# Patient Record
Sex: FEMALE | Race: WHITE | NOT HISPANIC OR LATINO | Employment: FULL TIME | URBAN - METROPOLITAN AREA
[De-identification: names, ages, dates, MRNs, and addresses within clinical notes are randomized per-mention and may not be internally consistent; named-entity substitution may affect disease eponyms.]

---

## 2017-02-13 ENCOUNTER — OFFICE VISIT (OUTPATIENT)
Dept: FAMILY MEDICINE | Facility: CLINIC | Age: 47
End: 2017-02-13
Payer: COMMERCIAL

## 2017-02-13 VITALS
WEIGHT: 177.2 LBS | BODY MASS INDEX: 41.01 KG/M2 | HEIGHT: 55 IN | TEMPERATURE: 97.6 F | HEART RATE: 68 BPM | SYSTOLIC BLOOD PRESSURE: 134 MMHG | DIASTOLIC BLOOD PRESSURE: 86 MMHG

## 2017-02-13 DIAGNOSIS — Z00.00 ROUTINE HISTORY AND PHYSICAL EXAMINATION OF ADULT: ICD-10-CM

## 2017-02-13 DIAGNOSIS — Z12.31 ENCOUNTER FOR SCREENING MAMMOGRAM FOR BREAST CANCER: Primary | ICD-10-CM

## 2017-02-13 LAB
ALBUMIN SERPL-MCNC: 4 G/DL (ref 3.4–5)
ALP SERPL-CCNC: 51 U/L (ref 40–150)
ALT SERPL W P-5'-P-CCNC: 119 U/L (ref 0–50)
ANION GAP SERPL CALCULATED.3IONS-SCNC: 7 MMOL/L (ref 3–14)
AST SERPL W P-5'-P-CCNC: 50 U/L (ref 0–45)
BASOPHILS # BLD AUTO: 0 10E9/L (ref 0–0.2)
BASOPHILS NFR BLD AUTO: 0.2 %
BILIRUB SERPL-MCNC: 0.6 MG/DL (ref 0.2–1.3)
BUN SERPL-MCNC: 7 MG/DL (ref 7–30)
CALCIUM SERPL-MCNC: 9.1 MG/DL (ref 8.5–10.1)
CHLORIDE SERPL-SCNC: 106 MMOL/L (ref 94–109)
CHOLEST SERPL-MCNC: 158 MG/DL
CO2 SERPL-SCNC: 28 MMOL/L (ref 20–32)
CREAT SERPL-MCNC: 0.69 MG/DL (ref 0.52–1.04)
DIFFERENTIAL METHOD BLD: ABNORMAL
EOSINOPHIL # BLD AUTO: 0.1 10E9/L (ref 0–0.7)
EOSINOPHIL NFR BLD AUTO: 2.1 %
ERYTHROCYTE [DISTWIDTH] IN BLOOD BY AUTOMATED COUNT: 12.7 % (ref 10–15)
GFR SERPL CREATININE-BSD FRML MDRD: ABNORMAL ML/MIN/1.7M2
GLUCOSE SERPL-MCNC: 85 MG/DL (ref 70–99)
HCT VFR BLD AUTO: 39.5 % (ref 35–47)
HDLC SERPL-MCNC: 49 MG/DL
HGB BLD-MCNC: 13.7 G/DL (ref 11.7–15.7)
LDLC SERPL CALC-MCNC: 86 MG/DL
LYMPHOCYTES # BLD AUTO: 2.2 10E9/L (ref 0.8–5.3)
LYMPHOCYTES NFR BLD AUTO: 44.5 %
MCH RBC QN AUTO: 31.4 PG (ref 26.5–33)
MCHC RBC AUTO-ENTMCNC: 34.7 G/DL (ref 31.5–36.5)
MCV RBC AUTO: 91 FL (ref 78–100)
MONOCYTES # BLD AUTO: 0.4 10E9/L (ref 0–1.3)
MONOCYTES NFR BLD AUTO: 8 %
NEUTROPHILS # BLD AUTO: 2.2 10E9/L (ref 1.6–8.3)
NEUTROPHILS NFR BLD AUTO: 45.2 %
NONHDLC SERPL-MCNC: 109 MG/DL
PLATELET # BLD AUTO: 134 10E9/L (ref 150–450)
POTASSIUM SERPL-SCNC: 3.9 MMOL/L (ref 3.4–5.3)
PROT SERPL-MCNC: 7 G/DL (ref 6.8–8.8)
RBC # BLD AUTO: 4.36 10E12/L (ref 3.8–5.2)
SODIUM SERPL-SCNC: 141 MMOL/L (ref 133–144)
TRIGL SERPL-MCNC: 115 MG/DL
WBC # BLD AUTO: 4.9 10E9/L (ref 4–11)

## 2017-02-13 PROCEDURE — 87624 HPV HI-RISK TYP POOLED RSLT: CPT | Performed by: PHYSICIAN ASSISTANT

## 2017-02-13 PROCEDURE — 99396 PREV VISIT EST AGE 40-64: CPT | Performed by: PHYSICIAN ASSISTANT

## 2017-02-13 PROCEDURE — G0145 SCR C/V CYTO,THINLAYER,RESCR: HCPCS | Performed by: PHYSICIAN ASSISTANT

## 2017-02-13 PROCEDURE — 80053 COMPREHEN METABOLIC PANEL: CPT | Performed by: PHYSICIAN ASSISTANT

## 2017-02-13 PROCEDURE — 85025 COMPLETE CBC W/AUTO DIFF WBC: CPT | Performed by: PHYSICIAN ASSISTANT

## 2017-02-13 PROCEDURE — 36415 COLL VENOUS BLD VENIPUNCTURE: CPT | Performed by: PHYSICIAN ASSISTANT

## 2017-02-13 PROCEDURE — 80061 LIPID PANEL: CPT | Performed by: PHYSICIAN ASSISTANT

## 2017-02-13 NOTE — LETTER
[facility]  [address] [place] [id number]  [phone]      February 20, 2017    Alis APRIL Annieharismaijennifer  [address] [facility] [address]    Dear Alis,  We are happy to inform you that your PAP smear result from 2/13/17 is normal.  We are now able to do a follow up test on PAP smears. The DNA test is for HPV (Human Papilloma Virus). Cervical cancer is closely linked with certain types of HPV. Your result showed no evidence of high risk HPV.  Therefore we recommend you return in 3 years for your next pap smear and HPV test.  You will still need to return to the clinic every year for an annual exam and other preventive tests.  Please contact the clinic with any questions.  Sincerely,  Josue Sue PA-C/arvin

## 2017-02-13 NOTE — MR AVS SNAPSHOT
After Visit Summary   2/13/2017    Alis Norris    MRN: [id number]           Patient Information     Date Of Birth          1970        Visit Information        Provider Department      2/13/2017 9:00 AM Josue Sue PA-C [facility]        Today's Diagnoses     Encounter for screening mammogram for breast cancer    -  1    Routine history and physical examination of adult          Care Instructions      Preventive Health Recommendations  Female Ages 40 to 49    Yearly exam:     See your health care provider every year in order to  1. Review health changes.   2. Discuss preventive care.    3. Review your medicines if your doctor prescribed any.      Get a Pap test every three years (unless you have an abnormal result and your provider advises testing more often).      If you get Pap tests with HPV test, you only need to test every 5 years, unless you have an abnormal result. You do not need a Pap test if your uterus was removed (hysterectomy) and you have not had cancer.      You should be tested each year for STDs (sexually transmitted diseases), if you're at risk.       Ask your doctor if you should have a mammogram.      Have a colonoscopy (test for colon cancer) if someone in your family has had colon cancer or polyps before age 50.       Have a cholesterol test every 5 years.       Have a diabetes test (fasting glucose) after age 45. If you are at risk for diabetes, you should have this test every 3 years.    Shots: Get a flu shot each year. Get a tetanus shot every 10 years.     Nutrition:     Eat at least 5 servings of fruits and vegetables each day.    Eat whole-grain bread, whole-wheat pasta and brown rice instead of white grains and rice.    Talk to your provider about Calcium and Vitamin D.     Lifestyle    Exercise at least 150 minutes a week (an average of 30 minutes a day, 5 days a week). This will help you control your weight and prevent disease.    Limit  "alcohol to one drink per day.    No smoking.     Wear sunscreen to prevent skin cancer.    See your dentist every six months for an exam and cleaning.        Follow-ups after your visit        Future tests that were ordered for you today     Open Future Orders        Priority Expected Expires Ordered    *MA Screening Digital Bilateral Routine  2018            Who to contact     If you have questions or need follow up information about today's clinic visit or your schedule please contact [facility] directly at [phone].  Normal or non-critical lab and imaging results will be communicated to you by SnapMyAdhart, letter or phone within 4 business days after the clinic has received the results. If you do not hear from us within 7 days, please contact the clinic through Instagramt or phone. If you have a critical or abnormal lab result, we will notify you by phone as soon as possible.  Submit refill requests through InfraReDx or call your pharmacy and they will forward the refill request to us. Please allow 3 business days for your refill to be completed.          Additional Information About Your Visit        SnapMyAdharUbiquisys Information     InfraReDx lets you send messages to your doctor, view your test results, renew your prescriptions, schedule appointments and more. To sign up, go to [email or web address] . Click on \"Log in\" on the left side of the screen, which will take you to the Welcome page. Then click on \"Sign up Now\" on the right side of the page.     You will be asked to enter the access code listed below, as well as some personal information. Please follow the directions to create your username and password.     Your access code is: [email or web address]  Expires: 2017 10:06 AM     Your access code will  in 90 days. If you need help or a new code, please call your [facility] or [phone].        Care EveryWhere ID     This is your Care EveryWhere ID. This could be used by other " "organizations to access your [place] medical records  [id number]        Your Vitals Were     Pulse Temperature Height Last Period BMI (Body Mass Index)       68 97.6  F (36.4  C) (Tympanic) 4' 4\" (1.321 m) 03/01/2003 46.07 kg/m2        Blood Pressure from Last 3 Encounters:   02/13/17 134/86   11/18/14 132/84   11/27/12 131/82    Weight from Last 3 Encounters:   02/13/17 177 lb 3.2 oz (80.4 kg)   11/18/14 148 lb (67.1 kg)   11/27/12 172 lb 6.4 oz (78.2 kg)              We Performed the Following     CBC with platelets and differential     Comprehensive metabolic panel (BMP + Alb, Alk Phos, ALT, AST, Total. Bili, TP)     Lipid Profile with reflex to direct LDL        Primary Care Provider    Provider Unknown       No address on file        Thank you!     Thank you for choosing [facility]  for your care. Our goal is always to provide you with excellent care. Hearing back from our patients is one way we can continue to improve our services. Please take a few minutes to complete the written survey that you may receive in the mail after your visit with us. Thank you!             Your Updated Medication List - Protect others around you: Learn how to safely use, store and throw away your medicines at www.disposemymeds.org.          This list is accurate as of: 2/13/17 10:06 AM.  Always use your most recent med list.                   Brand Name Dispense Instructions for use    CALCIUM 600 + D PO      Reported on 2/13/2017       CHEWABLE MULTIPLE VITAMINS OR          VITAMIN D PO      Reported on 2/13/2017         "

## 2017-02-13 NOTE — PROGRESS NOTES
Physical      SUBJECTIVE:     CC: Alis Norris is an 46 year old woman who presents for preventive health visit.     Patient reports shooting breast pain for the past few months, unsure but may be cyclic with menstrual cycle. She has a family history of breast cancer in two paternal aunts, both diagnosed after 50-years-old.     She also has been experiencing dizziness and blurry vision on occasion at work. She believes it may be due to dehydration because symptoms resolve after increasing fluid intake.     Healthy Habits:    Do you get at least three servings of calcium containing foods daily (dairy, green leafy vegetables, etc.)? yes    Amount of exercise or daily activities, outside of work: 0 day(s) per week    Problems taking medications regularly No    Medication side effects: No    Have you had an eye exam in the past two years? yes    Do you see a dentist twice per year? yes    Do you have sleep apnea, excessive snoring or daytime drowsiness?yes-snoring       Today's PHQ-2 Score:   PHQ-2 ( 1999 Pfizer) 10/22/2012   Q1: Little interest or pleasure in doing things 0   Q2: Feeling down, depressed or hopeless 0   PHQ-2 Score 0       Abuse: Current or Past(Physical, Sexual or Emotional)- No  Do you feel safe in your environment - Yes    Social History   Substance Use Topics     Smoking status: Never Smoker     Smokeless tobacco: Never Used     Alcohol use No     The patient does not drink >3 drinks per day nor >7 drinks per week.    Recent Labs   Lab Test  11/18/14   1707  07/23/09   1004   CHOL  208*  196   HDL  64  48*   LDL  107  105   TRIG  184*  215*   CHOLHDLRATIO  3.2  4.0       Reviewed orders with patient.  Reviewed health maintenance and updated orders accordingly - Yes    Mammo Decision Support:  Patient under age 50, mutual decision reflected in health maintenance. Alternate mammogram schedule due to breast cancer history and breast pain.    Pertinent mammograms are reviewed under the imaging  tab.  History of abnormal Pap smear:   Last 3 Pap Results:   PAP (no units)   Date Value   2014 NIL   10/22/2012 NIL   10/12/2011 NIL     All Histories reviewed and updated in Epic.  Past Medical History   Diagnosis Date     Carcinoma in situ of cervix uteri      s/p LEEP      Past Surgical History   Procedure Laterality Date     Surgical history of -         x3     Surgical history of -        Breast reduction surgery     Hysterectomy, pap still indicated  2004     C-Hyst (supracervical) for placenta accreta     Hc conization cervix,knife/laser       Followed by LEEP-CIS     Obstetric History       T0      TAB0   SAB0   E0   M0   L3       # Outcome Date GA Lbr Ayaan/2nd Weight Sex Delivery Anes PTL Lv   3 Para            2 Para            1 Para                   ROS:  C: NEGATIVE for fever, chills, change in weight  I: NEGATIVE for worrisome rashes, moles or lesions  E: NEGATIVE for vision changes or irritation  ENT: NEGATIVE for ear, mouth and throat problems  R: NEGATIVE for significant cough or SOB  B: POSITIVE for shooting pain. NEGATIVE for masses, tenderness or discharge.  CV: NEGATIVE for chest pain, palpitations or peripheral edema  GI: NEGATIVE for nausea, abdominal pain, heartburn, or change in bowel habits  : NEGATIVE for unusual urinary or vaginal symptoms. Periods are regular.  M: NEGATIVE for significant arthralgias or myalgia  N: POSITIVE for dizziness or lightheadedness. NEGATIVE for weakness or headaches.  P: NEGATIVE for changes in mood or affect    Problem list, Medication list, Allergies, and Medical/Social/Surgical histories reviewed in [facility] and updated as appropriate.  BP Readings from Last 3 Encounters:   17 134/86   14 132/84   12 131/82    Wt Readings from Last 3 Encounters:   17 177 lb 3.2 oz (80.4 kg)   14 148 lb (67.1 kg)   12 172 lb 6.4 oz (78.2 kg)         Patient Active Problem List   Diagnosis     Mild major  "depression (H)     Ganglion cyst     HYPERTROPHIC SCAR     CARDIOVASCULAR SCREENING; LDL GOAL LESS THAN 160     Hyperlipidemia LDL goal <160     Past Surgical History   Procedure Laterality Date     Surgical history of -         x3     Surgical history of -        Breast reduction surgery     Hysterectomy, pap still indicated  2004     C-Hyst (supracervical) for placenta accreta     Hc conization cervix,knife/laser       Followed by LEEP-[facility]IS       Social History   Substance Use Topics     Smoking status: Never Smoker     Smokeless tobacco: Never Used     Alcohol use No     Family History   Problem Relation Age of Onset     Obesity Father      DIABETES Father      Hypertension Father      65     CEREBROVASCULAR DISEASE Maternal Grandmother      Cardiovascular Paternal Grandmother      DIABETES Paternal Grandfather      Depression Mother          Current Outpatient Prescriptions   Medication Sig Dispense Refill     Pediatric Multiple Vitamins (CHEWABLE MULTIPLE VITAMINS OR)        Calcium Carbonate-Vitamin D (CALCIUM 600 + D OR) Reported on 2017       VITAMIN D OR Reported on 2017       No Known Allergies  Recent Labs   Lab Test  14   1707  10/22/12   1857  09   1004   LDL  107   --   105   HDL  64   --   48*   TRIG  184*   --   215*   ALT   --   29   --    CR  0.69   --    --    GFRESTIMATED  >90  Non  GFR Calc     --    --    GFRESTBLACK  >90   GFR Calc     --    --    POTASSIUM  3.5   --    --    TSH  1.42   --   0.95      OBJECTIVE:     /86 (Cuff Size: Adult Regular)  Pulse 68  Temp 97.6  F (36.4  C) (Tympanic)  Ht 4' 4\" (1.321 m)  Wt 177 lb 3.2 oz (80.4 kg)  LMP 2003  BMI 46.07 kg/m2  EXAM:  GENERAL: healthy, alert and no distress  EYES: Eyes grossly normal to inspection, PERRL and conjunctivae and sclerae normal  HENT: ear canals and TM's normal, nose and mouth without ulcers or lesions  NECK: no adenopathy, no asymmetry, " "masses, or scars and thyroid normal to palpation  RESP: lungs clear to auscultation - no rales, rhonchi or wheezes  BREAST: normal without masses, tenderness or nipple discharge and no palpable axillary masses or adenopathy  CV: regular rate and rhythm, normal S1 S2, no S3 or S4, no murmur, click or rub, no peripheral edema and peripheral pulses strong  ABDOMEN: soft, nontender, no hepatosplenomegaly, no masses and bowel sounds normal   (female): normal female external genitalia, normal urethral meatus, vaginal mucosa pink, moist, well rugated, and normal cervix/adnexa/uterus without masses or discharge  MS: no gross musculoskeletal defects noted, no edema  SKIN: no suspicious lesions or rashes  NEURO: Normal strength and tone, mentation intact and speech normal  PSYCH: mentation appears normal, affect normal/bright    ASSESSMENT/PLAN:         ICD-10-CM    1. Encounter for screening mammogram for breast cancer Z12.31 *MA Screening Digital Bilateral   2. Routine history and physical examination of adult Z00.00 Lipid Profile with reflex to direct LDL     CBC with platelets and differential     Comprehensive metabolic panel (BMP + Alb, Alk Phos, ALT, AST, Total. Bili, TP)     Pap imaged thin layer screen with HPV - recommended age 30 - 65     HPV High Risk Types DNA Cervical       COUNSELING:   Reviewed preventive health counseling, as reflected in patient instructions       Regular exercise       Healthy diet/nutrition       Vision screening       Osteoporosis Prevention/Bone Health     Reports that she has never smoked. She has never used smokeless tobacco.    Estimated body mass index is 46.07 kg/(m^2) as calculated from the following:    Height as of this encounter: 4' 4\" (1.321 m).    Weight as of this encounter: 177 lb 3.2 oz (80.4 kg).   Weight management plan: Discussed healthy diet and exercise guidelines and patient will follow up in 12 months in clinic to re-evaluate.    Discussed dehydration verse anemia " causing symptoms of dizziness and blurry vision. Suggested to increase fluid intake and CBC ordered to screen for low Hgb.     Ordered screening mammogram due to recent breast pain and family history of breast cancer.     Counseling Resources:  ATP IV Guidelines  Pooled Cohorts Equation Calculator  Breast Cancer Risk Calculator  FRAX Risk Assessment  ICSI Preventive Guidelines  Dietary Guidelines for Americans, 2010  USDA's MyPlate  ASA Prophylaxis  Lung CA Screening    Josue Sue PA-C  [facility]

## 2017-02-13 NOTE — NURSING NOTE
"Chief Complaint   Patient presents with     Physical       Initial /86 (Cuff Size: Adult Regular)  Pulse 68  Temp 97.6  F (36.4  C) (Tympanic)  Ht 4' 4\" (1.321 m)  Wt 177 lb 3.2 oz (80.4 kg)  LMP 03/01/2003  BMI 46.07 kg/m2 Estimated body mass index is 46.07 kg/(m^2) as calculated from the following:    Height as of this encounter: 4' 4\" (1.321 m).    Weight as of this encounter: 177 lb 3.2 oz (80.4 kg).  Medication Reconciliation: complete    Health Maintenance that is potentially due pending provider review:  Pap Smear    Possibly completing today per provider review.    Yael GARCIA CMA    "

## 2017-02-15 LAB
COPATH REPORT: NORMAL
PAP: NORMAL

## 2017-02-17 LAB
FINAL DIAGNOSIS: NORMAL
HPV HR 12 DNA CVX QL NAA+PROBE: NEGATIVE
HPV16 DNA SPEC QL NAA+PROBE: NEGATIVE
HPV18 DNA SPEC QL NAA+PROBE: NEGATIVE
SPECIMEN DESCRIPTION: NORMAL

## 2019-05-04 ENCOUNTER — TELEPHONE (OUTPATIENT)
Dept: FAMILY MEDICINE | Facility: CLINIC | Age: 49
End: 2019-05-04

## 2019-08-30 ENCOUNTER — OFFICE VISIT (OUTPATIENT)
Dept: FAMILY MEDICINE | Facility: CLINIC | Age: 49
End: 2019-08-30
Payer: COMMERCIAL

## 2019-08-30 VITALS
WEIGHT: 186 LBS | RESPIRATION RATE: 17 BRPM | TEMPERATURE: 97.8 F | DIASTOLIC BLOOD PRESSURE: 80 MMHG | BODY MASS INDEX: 34.23 KG/M2 | HEART RATE: 64 BPM | OXYGEN SATURATION: 98 % | HEIGHT: 62 IN | SYSTOLIC BLOOD PRESSURE: 120 MMHG

## 2019-08-30 DIAGNOSIS — Z12.11 SPECIAL SCREENING FOR MALIGNANT NEOPLASMS, COLON: ICD-10-CM

## 2019-08-30 DIAGNOSIS — Z00.00 ROUTINE GENERAL MEDICAL EXAMINATION AT A HEALTH CARE FACILITY: Primary | ICD-10-CM

## 2019-08-30 DIAGNOSIS — E78.5 HYPERLIPIDEMIA LDL GOAL <160: ICD-10-CM

## 2019-08-30 DIAGNOSIS — D06.9 CARCINOMA IN SITU OF CERVIX, UNSPECIFIED LOCATION: ICD-10-CM

## 2019-08-30 LAB
CHOLEST SERPL-MCNC: 210 MG/DL
GLUCOSE SERPL-MCNC: 97 MG/DL (ref 70–99)
HDLC SERPL-MCNC: 61 MG/DL
LDLC SERPL CALC-MCNC: 113 MG/DL
NONHDLC SERPL-MCNC: 149 MG/DL
TRIGL SERPL-MCNC: 182 MG/DL

## 2019-08-30 PROCEDURE — 87624 HPV HI-RISK TYP POOLED RSLT: CPT | Performed by: FAMILY MEDICINE

## 2019-08-30 PROCEDURE — 82947 ASSAY GLUCOSE BLOOD QUANT: CPT | Performed by: FAMILY MEDICINE

## 2019-08-30 PROCEDURE — G0145 SCR C/V CYTO,THINLAYER,RESCR: HCPCS | Performed by: FAMILY MEDICINE

## 2019-08-30 PROCEDURE — 99396 PREV VISIT EST AGE 40-64: CPT | Performed by: FAMILY MEDICINE

## 2019-08-30 PROCEDURE — 36415 COLL VENOUS BLD VENIPUNCTURE: CPT | Performed by: FAMILY MEDICINE

## 2019-08-30 PROCEDURE — 80061 LIPID PANEL: CPT | Performed by: FAMILY MEDICINE

## 2019-08-30 ASSESSMENT — ENCOUNTER SYMPTOMS
NERVOUS/ANXIOUS: 0
PALPITATIONS: 0
FREQUENCY: 0
HEADACHES: 0
COUGH: 0
JOINT SWELLING: 0
ARTHRALGIAS: 1
DIARRHEA: 0
CONSTIPATION: 0
SORE THROAT: 0
HEMATURIA: 0
ABDOMINAL PAIN: 0
CHILLS: 0
WEAKNESS: 0
DYSURIA: 0
FEVER: 0
HEARTBURN: 0
DIZZINESS: 0
PARESTHESIAS: 0
BREAST MASS: 0
EYE PAIN: 0
MYALGIAS: 0
HEMATOCHEZIA: 0
NAUSEA: 0
SHORTNESS OF BREATH: 0

## 2019-08-30 ASSESSMENT — MIFFLIN-ST. JEOR: SCORE: 1421.94

## 2019-08-30 ASSESSMENT — PATIENT HEALTH QUESTIONNAIRE - PHQ9
5. POOR APPETITE OR OVEREATING: NOT AT ALL
SUM OF ALL RESPONSES TO PHQ QUESTIONS 1-9: 2

## 2019-08-30 ASSESSMENT — ANXIETY QUESTIONNAIRES
IF YOU CHECKED OFF ANY PROBLEMS ON THIS QUESTIONNAIRE, HOW DIFFICULT HAVE THESE PROBLEMS MADE IT FOR YOU TO DO YOUR WORK, TAKE CARE OF THINGS AT HOME, OR GET ALONG WITH OTHER PEOPLE: NOT DIFFICULT AT ALL
5. BEING SO RESTLESS THAT IT IS HARD TO SIT STILL: NOT AT ALL
6. BECOMING EASILY ANNOYED OR IRRITABLE: NOT AT ALL
2. NOT BEING ABLE TO STOP OR CONTROL WORRYING: NOT AT ALL
3. WORRYING TOO MUCH ABOUT DIFFERENT THINGS: NOT AT ALL
1. FEELING NERVOUS, ANXIOUS, OR ON EDGE: NOT AT ALL
7. FEELING AFRAID AS IF SOMETHING AWFUL MIGHT HAPPEN: NOT AT ALL
GAD7 TOTAL SCORE: 0

## 2019-08-30 ASSESSMENT — PAIN SCALES - GENERAL: PAINLEVEL: NO PAIN (0)

## 2019-08-30 NOTE — PROGRESS NOTES
SUBJECTIVE:   CC: Alis Norris is an 48 year old woman who presents for preventive health visit.   Chief Complaint   Patient presents with     Physical     Gyn Exam     pap      Mole on genitalia.   Breasts get sore once a month.  Both breasts.  Can be either one.  INtermittent.  Seems monthly.     She had a hysterectomy in 2004.  Immediately following delivery, placenta accreta.    2002 [facility] biopsy and Leep.     On no medications.     Healthy Habits:     Getting at least 3 servings of Calcium per day:  Yes    Bi-annual eye exam:  Yes    Dental care twice a year:  Yes    Sleep apnea or symptoms of sleep apnea:  Daytime drowsiness and Excessive snoring    Diet:  Regular (no restrictions)    Frequency of exercise:  None    Taking medications regularly:  Yes    Medication side effects:  None    PHQ-2 Total Score: 0    Additional concerns today:  Yes  Snores a lot.  No known apnea.  Sleepy during the day.     Today's PHQ-2 Score:   PHQ-2 ( 1999 Pfizer) 8/30/2019   Q1: Little interest or pleasure in doing things 0   Q2: Feeling down, depressed or hopeless 0   PHQ-2 Score 0   Q1: Little interest or pleasure in doing things Not at all   Q2: Feeling down, depressed or hopeless Not at all   PHQ-2 Score 0       Abuse: Current or Past(Physical, Sexual or Emotional)- No  Do you feel safe in your environment? Yes    Social History     Tobacco Use     Smoking status: Never Smoker     Smokeless tobacco: Never Used   Substance Use Topics     Alcohol use: Yes     Comment: minimal     Alcohol Use 8/30/2019   Prescreen: >3 drinks/day or >7 drinks/week? No   Prescreen: >3 drinks/day or >7 drinks/week? -     Reviewed orders with patient.  Reviewed health maintenance and updated orders accordingly - Yes    Mammogram Screening: Patient over age 50, mutual decision to screen reflected in health maintenance.    Pertinent mammograms are reviewed under the imaging tab.  History of abnormal Pap smear: YES - updated in Problem List and  Health Maintenance accordingly  PAP / HPV Latest Ref Rng & Units 2/13/2017 11/18/2014 10/22/2012   PAP - NIL NIL NIL   HPV 16 DNA NEG Negative - -   HPV 18 DNA NEG Negative - -   OTHER HR HPV NEG Negative - -     Patient Active Problem List    Diagnosis Date Noted     Hyperlipidemia LDL goal <160 11/18/2014     Priority: Medium     CARDIOVASCULAR SCREENING; LDL GOAL LESS THAN 160 10/31/2010     Priority: Medium     Mild major depression (H) 06/16/2008     Priority: Medium     Ganglion cyst 06/16/2008     Priority: Medium     left wrist       HYPERTROPHIC SCAR 06/16/2008     Priority: Medium     reduction mammoplasty scars         History   Smoking Status     Never Smoker   Smokeless Tobacco     Never Used       Family history:  CV disease: no  Breast cancer: P aunt  Colon cancer: no  Ovarian/uterine/cervical cancer: no    Multivitamin or Vit D use: no    Vaccines:Current    Pregnancy history: G-3, P-3  Contraception: na   LMP: 2004  Menses: na   NO hot flashes.     Lab Results   Component Value Date    PAP NIL 02/13/2017     Past colon cancer screen:no  Past mammogram:2006       Review of Systems   Constitutional: Negative for chills and fever.   HENT: Negative for congestion, ear pain, hearing loss and sore throat.    Eyes: Positive for visual disturbance. Negative for pain.   Respiratory: Negative for cough and shortness of breath.    Cardiovascular: Negative for chest pain, palpitations and peripheral edema.   Gastrointestinal: Negative for abdominal pain, constipation, diarrhea, heartburn, hematochezia and nausea.   Breasts:  Positive for tenderness. Negative for breast mass and discharge.   Genitourinary: Negative for dysuria, frequency, genital sores, hematuria, pelvic pain, urgency, vaginal bleeding and vaginal discharge.   Musculoskeletal: Positive for arthralgias. Negative for joint swelling and myalgias.   Skin: Negative for rash.   Neurological: Negative for dizziness, weakness, headaches and  "paresthesias.   Psychiatric/Behavioral: Negative for mood changes. The patient is not nervous/anxious.    She has occasional blurry vision for 10 minutes.  Triggering factors: being at work, possible stress related.  No headaches with the vision changes.     OBJECTIVE:                                                    OBJECTIVE:Blood pressure 120/80, pulse 64, temperature 97.8  F (36.6  C), resp. rate 17, height 1.575 m (5' 2\"), weight 84.4 kg (186 lb), last menstrual period 03/01/2003, SpO2 98 %, not currently breastfeeding. BMI=Body mass index is 34.02 kg/m .  GENERAL APPEARANCE ADULT: Alert, no acute distress, obese  EYES: PERRL, EOM normal, conjunctiva and lids normal  HENT: Ears and TMs normal, oral mucosa and posterior oropharynx normal  NECK: No adenopathy,masses or thyromegaly  RESP: lungs clear to auscultation   BREAST: normal to inspection and palpation, scars from breast reduction.  CV: normal rate, regular rhythm, no murmur or gallop  ABDOMEN: soft, no organomegaly, masses or tenderness   (female): external genitalia, urethral meatus, vagina, and cervix are within normal limits, bimanual exam unremarkable, no uterus.  PAP smear obtained today  Small medium brown seborrheic keratosis left labia majora appears benign.   MS: extremities normal, no peripheral edema     ASSESSMENT/PLAN:                                                    Lifestyle recommendations:regular exercise, at least 150 minutes per week (average 30 minutes 5 times a week)  keep working on losing weight (ideal BMI-body mass index is <25)  always wear seat belt  The following exams/tests were recommended and discussed for health maintenance:  Colon cancer screening beginning at age 50 if at normal risk  Mammogram screening recommendations differ among expert groups.  They may be started at age 40 or 50.  Screening can be yearly or every other year depending upon a person's risk and preference.   Cholesterol screening to evaluate " "cardiovascular risk.  Cholesterol screening every 4-5 years if normal values and low risk    (Z00.00) Routine general medical examination at a health care facility  (primary encounter diagnosis)  Comment:   Plan: Pap imaged thin layer screen with HPV -         recommended age 30 - 65 years (select HPV order        below), Lipid panel reflex to direct LDL         Fasting, Glucose        Fasting blood tests today.     (D06.9) Carcinoma in situ of cervix, unspecified location  Comment:   Plan: GASTROENTEROLOGY ADULT REF PROCEDURE ONLY [facility] [phone]; No Provider Preference        PAP    (E78.5) Hyperlipidemia LDL goal <160  Comment:   Plan: Blood tests today.     (Z12.11) Special screening for malignant neoplasms, colon  Comment:   Plan: Schedule an appointment for colonoscopies or gastroscopies with surgery scheduling.  Call [phone]  to schedule the procedures.         COUNSELING:  Reviewed preventive health counseling, as reflected in patient instructions  Special attention given to:        Regular exercise       Colon cancer screening    Estimated body mass index is 34.02 kg/m  as calculated from the following:    Height as of this encounter: 1.575 m (5' 2\").    Weight as of this encounter: 84.4 kg (186 lb).    Weight management plan: Discussed healthy diet and exercise guidelines     reports that she has never smoked. She has never used smokeless tobacco.      Counseling Resources:  ATP IV Guidelines  Pooled Cohorts Equation Calculator  Breast Cancer Risk Calculator  FRAX Risk Assessment  ICSI Preventive Guidelines  Dietary Guidelines for Americans, 2010  USDA's MyPlate  ASA Prophylaxis  Lung CA Screening    Juanjose Cardenas MD  [facility]  "

## 2019-08-30 NOTE — RESULT ENCOUNTER NOTE
Please call.  The blood glucose, a test for diabetes mellitus is in the normal range.   triglycerides and LDL cholesterol are increased.   PLAN: Weight loss, regular exercise with goal of 30 minutes most days of the week and eating a prudent diet (lots of fruits and vegetables, limiting unhealthy fats and excessive calories) can help improve lipids.

## 2019-08-30 NOTE — PATIENT INSTRUCTIONS
Preventive Health Recommendations  Female Ages 40 to 49    Yearly exam:     See your health care provider every year in order to  1. Review health changes.   2. Discuss preventive care.    3. Review your medicines if your doctor prescribed any.      Get a Pap test every three years (unless you have an abnormal result and your provider advises testing more often).      If you get Pap tests with HPV test, you only need to test every 5 years, unless you have an abnormal result. You do not need a Pap test if your uterus was removed (hysterectomy) and you have not had cancer.      You should be tested each year for STDs (sexually transmitted diseases), if you're at risk.     Ask your doctor if you should have a mammogram.      Have a colonoscopy (test for colon cancer) if someone in your family has had colon cancer or polyps before age 50.       Have a cholesterol test every 5 years.       Have a diabetes test (fasting glucose) after age 45. If you are at risk for diabetes, you should have this test every 3 years.    Shots: Get a flu shot each year. Get a tetanus shot every 10 years.     Nutrition:     Eat at least 5 servings of fruits and vegetables each day.    Eat whole-grain bread, whole-wheat pasta and brown rice instead of white grains and rice.    Get adequate Calcium and Vitamin D.      Lifestyle    Exercise at least 150 minutes a week (an average of 30 minutes a day, 5 days a week). This will help you control your weight and prevent disease.    Limit alcohol to one drink per day.    No smoking.     Wear sunscreen to prevent skin cancer.    See your dentist every six months for an exam and cleaning.    ASSESSMENT/PLAN:                                                    Lifestyle recommendations:regular exercise, at least 150 minutes per week (average 30 minutes 5 times a week)  keep working on losing weight (ideal BMI-body mass index is <25)  always wear seat belt  The following exams/tests were recommended and  discussed for health maintenance:  Colon cancer screening beginning at age 50 if at normal risk  Mammogram screening recommendations differ among expert groups.  They may be started at age 40 or 50.  Screening can be yearly or every other year depending upon a person's risk and preference.   Cholesterol screening to evaluate cardiovascular risk.  Cholesterol screening every 4-5 years if normal values and low risk    (Z00.00) Routine general medical examination at a health care facility  (primary encounter diagnosis)  Comment:   Plan: Pap imaged thin layer screen with HPV -         recommended age 30 - 65 years (select HPV order        below), Lipid panel reflex to direct LDL         Fasting, Glucose        Fasting blood tests today.     (D06.9) Carcinoma in situ of cervix, unspecified location  Comment:   Plan: GASTROENTEROLOGY ADULT REF PROCEDURE ONLY [facility] [phone]; No Provider Preference        PAP    (E78.5) Hyperlipidemia LDL goal <160  Comment:   Plan: Blood tests today.     (Z12.11) Special screening for malignant neoplasms, colon  Comment:   Plan: Schedule an appointment for colonoscopies or gastroscopies with surgery scheduling.  Call [phone]  to schedule the procedures.

## 2019-08-30 NOTE — NURSING NOTE
"Chief Complaint   Patient presents with     Physical     Gyn Exam     pap       Initial /80 (BP Location: Right arm, Patient Position: Chair, Cuff Size: Adult Regular)   Pulse 64   Temp 97.8  F (36.6  C)   Resp 17   Ht 1.575 m (5' 2\")   Wt 84.4 kg (186 lb)   LMP 03/01/2003   SpO2 98%   Breastfeeding? No   BMI 34.02 kg/m   Estimated body mass index is 34.02 kg/m  as calculated from the following:    Height as of this encounter: 1.575 m (5' 2\").    Weight as of this encounter: 84.4 kg (186 lb).    Patient presents to the clinic using No DME    Health Maintenance that is potentially due pending provider review:  PHQ9    Possibly completing today per provider review.    Is there anyone who you would like to be able to receive your results? Yes Janes  If yes have patient fill out HEATHER  Preeti Lomax CMA    "

## 2019-08-31 ASSESSMENT — ANXIETY QUESTIONNAIRES: GAD7 TOTAL SCORE: 0

## 2019-09-04 LAB
COPATH REPORT: NORMAL
PAP: NORMAL

## 2019-09-06 LAB
FINAL DIAGNOSIS: NORMAL
HPV HR 12 DNA CVX QL NAA+PROBE: NEGATIVE
HPV16 DNA SPEC QL NAA+PROBE: NEGATIVE
HPV18 DNA SPEC QL NAA+PROBE: NEGATIVE
SPECIMEN DESCRIPTION: NORMAL
SPECIMEN SOURCE CVX/VAG CYTO: NORMAL

## 2019-09-11 NOTE — RESULT ENCOUNTER NOTE
Hi Veronica,  Your PAP test is normal.  Also, the test for the wart virus which causes cervical cancer is normal.    It has been normal for many years.   PLAN: I think you could do repeat PAP in 3 years.  GLORY BERRY MD

## 2019-11-03 ENCOUNTER — HEALTH MAINTENANCE LETTER (OUTPATIENT)
Age: 49
End: 2019-11-03

## 2020-02-25 ENCOUNTER — ALLIED HEALTH/NURSE VISIT (OUTPATIENT)
Dept: FAMILY MEDICINE | Facility: CLINIC | Age: 50
End: 2020-02-25
Payer: COMMERCIAL

## 2020-02-25 VITALS — SYSTOLIC BLOOD PRESSURE: 160 MMHG | DIASTOLIC BLOOD PRESSURE: 90 MMHG

## 2020-02-25 DIAGNOSIS — H53.8 BLURRED VISION: Primary | ICD-10-CM

## 2020-02-25 PROCEDURE — 99207 ZZC NO CHARGE NURSE ONLY: CPT

## 2020-02-25 NOTE — PROGRESS NOTES
S-(situation): Alis is walk in to clinic requesting BP check.  States was in lighted cafeteria area today and when went to her classroom, she noticed she had blurred vision.  She went to the nurse's office at 1255 and BP was 160/103.  BP at 1325 was 155/100.  Blurred vision subsided after a few minutes.  She feels fine in clinic    B-(background): sister, father and mother have HTN.  Father has diabetes.  Not on any meds.  She is a teacher.  Had physical 8/30/19, /80 then.      A-(assessment): high BP at school today accompanied by brief period of blurry vision.      R-(recommendations): advised needs to be seen in clinic to be evaluated.  Preeti Aponte RN

## 2020-03-04 ENCOUNTER — OFFICE VISIT (OUTPATIENT)
Dept: FAMILY MEDICINE | Facility: CLINIC | Age: 50
End: 2020-03-04
Payer: COMMERCIAL

## 2020-03-04 VITALS
HEIGHT: 62 IN | BODY MASS INDEX: 34.48 KG/M2 | TEMPERATURE: 98.7 F | RESPIRATION RATE: 18 BRPM | OXYGEN SATURATION: 98 % | SYSTOLIC BLOOD PRESSURE: 152 MMHG | DIASTOLIC BLOOD PRESSURE: 86 MMHG | HEART RATE: 76 BPM | WEIGHT: 187.4 LBS

## 2020-03-04 DIAGNOSIS — Z12.31 ENCOUNTER FOR SCREENING MAMMOGRAM FOR BREAST CANCER: ICD-10-CM

## 2020-03-04 DIAGNOSIS — Z12.11 SPECIAL SCREENING FOR MALIGNANT NEOPLASMS, COLON: ICD-10-CM

## 2020-03-04 DIAGNOSIS — I10 HYPERTENSION, UNSPECIFIED TYPE: Primary | ICD-10-CM

## 2020-03-04 LAB
ALBUMIN SERPL-MCNC: 4.1 G/DL (ref 3.4–5)
ALBUMIN UR-MCNC: NEGATIVE MG/DL
ALP SERPL-CCNC: 62 U/L (ref 40–150)
ALT SERPL W P-5'-P-CCNC: 67 U/L (ref 0–50)
ANION GAP SERPL CALCULATED.3IONS-SCNC: 6 MMOL/L (ref 3–14)
APPEARANCE UR: CLEAR
AST SERPL W P-5'-P-CCNC: 34 U/L (ref 0–45)
BILIRUB SERPL-MCNC: 0.6 MG/DL (ref 0.2–1.3)
BILIRUB UR QL STRIP: NEGATIVE
BUN SERPL-MCNC: 12 MG/DL (ref 7–30)
CALCIUM SERPL-MCNC: 8.9 MG/DL (ref 8.5–10.1)
CHLORIDE SERPL-SCNC: 107 MMOL/L (ref 94–109)
CO2 SERPL-SCNC: 29 MMOL/L (ref 20–32)
COLOR UR AUTO: YELLOW
CREAT SERPL-MCNC: 0.69 MG/DL (ref 0.52–1.04)
ERYTHROCYTE [DISTWIDTH] IN BLOOD BY AUTOMATED COUNT: 12.4 % (ref 10–15)
GFR SERPL CREATININE-BSD FRML MDRD: >90 ML/MIN/{1.73_M2}
GLUCOSE SERPL-MCNC: 96 MG/DL (ref 70–99)
GLUCOSE UR STRIP-MCNC: NEGATIVE MG/DL
HCT VFR BLD AUTO: 40.1 % (ref 35–47)
HGB BLD-MCNC: 14.1 G/DL (ref 11.7–15.7)
HGB UR QL STRIP: NEGATIVE
KETONES UR STRIP-MCNC: NEGATIVE MG/DL
LEUKOCYTE ESTERASE UR QL STRIP: NEGATIVE
MCH RBC QN AUTO: 31.3 PG (ref 26.5–33)
MCHC RBC AUTO-ENTMCNC: 35.2 G/DL (ref 31.5–36.5)
MCV RBC AUTO: 89 FL (ref 78–100)
NITRATE UR QL: NEGATIVE
PH UR STRIP: 7 PH (ref 5–7)
PLATELET # BLD AUTO: 153 10E9/L (ref 150–450)
POTASSIUM SERPL-SCNC: 3.6 MMOL/L (ref 3.4–5.3)
PROT SERPL-MCNC: 7 G/DL (ref 6.8–8.8)
RBC # BLD AUTO: 4.51 10E12/L (ref 3.8–5.2)
SODIUM SERPL-SCNC: 142 MMOL/L (ref 133–144)
SOURCE: ABNORMAL
SP GR UR STRIP: 1.02 (ref 1–1.03)
TSH SERPL DL<=0.005 MIU/L-ACNC: 1.22 MU/L (ref 0.4–4)
UROBILINOGEN UR STRIP-ACNC: 2 EU/DL (ref 0.2–1)
WBC # BLD AUTO: 6.2 10E9/L (ref 4–11)

## 2020-03-04 PROCEDURE — 85027 COMPLETE CBC AUTOMATED: CPT | Performed by: PHYSICIAN ASSISTANT

## 2020-03-04 PROCEDURE — 80053 COMPREHEN METABOLIC PANEL: CPT | Performed by: PHYSICIAN ASSISTANT

## 2020-03-04 PROCEDURE — 81003 URINALYSIS AUTO W/O SCOPE: CPT | Performed by: PHYSICIAN ASSISTANT

## 2020-03-04 PROCEDURE — 84443 ASSAY THYROID STIM HORMONE: CPT | Performed by: PHYSICIAN ASSISTANT

## 2020-03-04 PROCEDURE — 36415 COLL VENOUS BLD VENIPUNCTURE: CPT | Performed by: PHYSICIAN ASSISTANT

## 2020-03-04 PROCEDURE — 99214 OFFICE O/P EST MOD 30 MIN: CPT | Performed by: PHYSICIAN ASSISTANT

## 2020-03-04 RX ORDER — LISINOPRIL 10 MG/1
10 TABLET ORAL DAILY
Qty: 90 TABLET | Refills: 3 | Status: SHIPPED | OUTPATIENT
Start: 2020-03-04 | End: 2021-05-18

## 2020-03-04 ASSESSMENT — ANXIETY QUESTIONNAIRES
6. BECOMING EASILY ANNOYED OR IRRITABLE: MORE THAN HALF THE DAYS
7. FEELING AFRAID AS IF SOMETHING AWFUL MIGHT HAPPEN: NOT AT ALL
GAD7 TOTAL SCORE: 3
5. BEING SO RESTLESS THAT IT IS HARD TO SIT STILL: NOT AT ALL
2. NOT BEING ABLE TO STOP OR CONTROL WORRYING: NOT AT ALL
1. FEELING NERVOUS, ANXIOUS, OR ON EDGE: SEVERAL DAYS
3. WORRYING TOO MUCH ABOUT DIFFERENT THINGS: NOT AT ALL

## 2020-03-04 ASSESSMENT — PATIENT HEALTH QUESTIONNAIRE - PHQ9
5. POOR APPETITE OR OVEREATING: NOT AT ALL
SUM OF ALL RESPONSES TO PHQ QUESTIONS 1-9: 0

## 2020-03-04 ASSESSMENT — MIFFLIN-ST. JEOR: SCORE: 1415.35

## 2020-03-04 NOTE — PROGRESS NOTES
Subjective     Alis Norris is a 49 year old female who presents to clinic today for the following health issues:    HPI   Hypertension Follow-up      Do you check your blood pressure regularly outside of the clinic? Yes     Are you following a low salt diet? No    Are your blood pressures ever more than 140 on the top number (systolic) OR more   than 90 on the bottom number (diastolic), for example 140/90? Yes      How many servings of fruits and vegetables do you eat daily?  0-1    On average, how many sweetened beverages do you drink each day (Examples: soda, juice, sweet tea, etc.  Do NOT count diet or artificially sweetened beverages)?   0    How many days per week do you exercise enough to make your heart beat faster? 3 or less    How many minutes a day do you exercise enough to make your heart beat faster? 9 or less    How many days per week do you miss taking your medication? 0    Patient Active Problem List   Diagnosis     Mild major depression (H)     Ganglion cyst     HYPERTROPHIC SCAR     CARDIOVASCULAR SCREENING; LDL GOAL LESS THAN 160     Hyperlipidemia LDL goal <160     Carcinoma in situ of cervix uteri     Past Surgical History:   Procedure Laterality Date     HC CONIZATION CERVIX,KNIFE/LASER      Followed by LEEP-CIS     HYSTERECTOMY, PAP STILL INDICATED  2004    C-Hyst (supracervical) for placenta accreta     SURGICAL HISTORY OF -        x3     SURGICAL HISTORY OF -       Breast reduction surgery       Social History     Tobacco Use     Smoking status: Never Smoker     Smokeless tobacco: Never Used   Substance Use Topics     Alcohol use: Yes     Comment: minimal     Family History   Problem Relation Age of Onset     Obesity Father      Diabetes Father      Hypertension Father         65     Cerebrovascular Disease Maternal Grandmother      Cardiovascular Paternal Grandmother      Diabetes Paternal Grandfather      Depression Mother      Colon Cancer No family hx of       Coronary Artery Disease No family hx of          Current Outpatient Medications   Medication Sig Dispense Refill     lisinopril (ZESTRIL) 10 MG tablet Take 1 tablet (10 mg) by mouth daily 90 tablet 3     No Known Allergies  BP Readings from Last 3 Encounters:   03/04/20 (!) 152/86   02/25/20 (!) 160/90   08/30/19 120/80    Wt Readings from Last 3 Encounters:   03/04/20 85 kg (187 lb 6.4 oz)   08/30/19 84.4 kg (186 lb)   02/13/17 80.4 kg (177 lb 3.2 oz)         Reviewed and updated as needed this visit by Provider         Review of Systems   ROS COMP: Constitutional, HEENT, cardiovascular, pulmonary, gi and skin, neuro systems are negative, except as otherwise noted.      Objective    LMP 03/01/2003   There is no height or weight on file to calculate BMI.  Physical Exam   Constitutional: healthy, alert, and no distress  Head: Normocephalic. Atraumatic  Eyes: No conjunctival injection, sclera anicteric, EOMI, PERRLA  Cardiovascular: RRR. No murmurs, clicks, gallops, or rubs. No peripheral edema.   Respiratory: No resp distress. Lungs CTAB bilaterally.   Musculoskeletal: extremities normal- no gross deformities noted, and normal muscle tone  Skin: no suspicious lesions or rashes  Neurologic: Gait normal. CN 2-12 grossly intact  Psychiatric: Mildly anxious otherwise mentation appears normal and affect normal/bright    Diagnostic Test Results:  Labs reviewed in Epic        Assessment & Plan   (I10) Hypertension, unspecified type  (primary encounter diagnosis)  Comment: 152/86.  Patient with new onset hypertension at 49 years old.  Strong family history of hypertension but she has never personally been on any medication for this.  Brought in BP log from home which shows elevated blood pressures greater than 150/90.  We will check some basic labs including a CBC, CMP, TSH and a UA to look for possible secondary causes of her new hypertension.  In the meantime we will start lisinopril 10 mg daily.  She will continue taking  her blood pressure at home and will call us in 2 weeks with her BP measurements.  Plan: Comprehensive metabolic panel, TSH with free T4        reflex, CBC with platelets, UA reflex to         Microscopic, lisinopril (ZESTRIL) 10 MG tablet          (Z12.31) Encounter for screening mammogram for breast cancer  Comment: Mammogram ordered  Plan: MA Screen Bilateral w/[facility]i          (Z12.11) Special screening for malignant neoplasms, colon  Comment: Colonoscopy ordered  Plan: GASTROENTEROLOGY ADULT REF PROCEDURE ONLY [facility] [phone]    Return in about 2 weeks (around 3/18/2020) for Routine Visit.    Jayjay Cintron PA-C  [facility]

## 2020-03-04 NOTE — NURSING NOTE
"Chief Complaint   Patient presents with     Hypertension       Initial BP (!) 152/86 (BP Location: Right arm, Patient Position: Sitting, Cuff Size: Adult Large)   Pulse 76   Temp 98.7  F (37.1  C) (Tympanic)   Resp 18   Ht 1.562 m (5' 1.5\")   Wt 85 kg (187 lb 6.4 oz)   LMP 03/01/2003   SpO2 98%   BMI 34.84 kg/m   Estimated body mass index is 34.84 kg/m  as calculated from the following:    Height as of this encounter: 1.562 m (5' 1.5\").    Weight as of this encounter: 85 kg (187 lb 6.4 oz).    Patient presents to the clinic using No DME    Health Maintenance that is potentially due pending provider review:  Mammogram and Colonoscopy/FIT    Gave pt phone number/pended order to schedule mammo and/or colonoscopy(or FIT)    Is there anyone who you would like to be able to receive your results? No  If yes have patient fill out HEATHER    Yael Gonzalez CMA    "

## 2020-03-04 NOTE — PATIENT INSTRUCTIONS
We will check some labs and call you with the results.     Start taking lisinopril every morning for your blood pressure.  Continue take her blood pressure one time a day.    I want you to focus on working on your diet mostly this revolves around eating less salt.  I also would recommend getting 150 minutes of exercise outside of work every week, this is about 20 minutes/day.    I know you are driving stressful but I want you to also work on your stress coping skills.  I highly recommend things like meditation, yoga and other distraction techniques like getting new hobbies.

## 2020-03-05 ASSESSMENT — ANXIETY QUESTIONNAIRES: GAD7 TOTAL SCORE: 3

## 2020-04-20 ENCOUNTER — VIRTUAL VISIT (OUTPATIENT)
Dept: FAMILY MEDICINE | Facility: CLINIC | Age: 50
End: 2020-04-20
Payer: COMMERCIAL

## 2020-04-20 DIAGNOSIS — I10 ESSENTIAL HYPERTENSION: Primary | ICD-10-CM

## 2020-04-20 PROCEDURE — 99213 OFFICE O/P EST LOW 20 MIN: CPT | Mod: GT | Performed by: PHYSICIAN ASSISTANT

## 2020-04-20 NOTE — PATIENT INSTRUCTIONS
Continue Lisinopril for now.   Get a home BP cuff at your local pharmacy. Call me with the readings and we can adjust your BP medication over the phone.   If not, come back to the clinic in 4-6 weeks for BP check.

## 2020-04-20 NOTE — PROGRESS NOTES
"Alis Norris is a 49 year old female who is being evaluated via a billable video visit.      The patient has been notified of following:     \"This telephone visit will be conducted via a call between you and your physician/provider. We have found that certain health care needs can be provided without the need for an in-person physical exam.  This service lets us provide the care you need with a video conversation.  If a prescription is necessary we can send it directly to your pharmacy.  If lab work is needed we can place an order for that and you can then stop by our lab to have the test done at a later time.    Telephone visits are billed at different rates depending on your insurance coverage.  Please reach out to your insurance provider with any questions.    If during the course of the call the physician/provider feels a video visit is not appropriate, you will not be charged for this service.\"    Patient has given verbal consent for Telephone visit? Yes    How would you like to obtain your AVS? Kishore    Patient would like the video invitation sent by: Send to e-mail at:[email or web address]    Subjective   Alis Norris is a 49 year old female who presents to clinic today for the following health issues:    Hypertension Follow-up      Do you check your blood pressure regularly outside of the clinic? No     Are you following a low salt diet? No    Are your blood pressures ever more than 140 on the top number (systolic) OR more   than 90 on the bottom number (diastolic), for example 140/90? No      How many servings of fruits and vegetables do you eat daily?  0-1    On average, how many sweetened beverages do you drink each day (Examples: soda, juice, sweet tea, etc.  Do NOT count diet or artificially sweetened beverages)?   0    How many days per week do you exercise enough to make your heart beat faster? 3 or less    How many minutes a day do you exercise enough to make your heart beat faster? 20 " "- 29  How many days per week do you miss taking your medication? 1    What makes it hard for you to take your medications?  remembering to take    Telephone Start Time: 8:58 AM    Patient Active Problem List   Diagnosis     Mild major depression (H)     Ganglion cyst     HYPERTROPHIC SCAR     CARDIOVASCULAR SCREENING; LDL GOAL LESS THAN 160     Hyperlipidemia LDL goal <160     Carcinoma in situ of cervix uteri     Essential hypertension     Past Surgical History:   Procedure Laterality Date     HC CONIZATION CERVIX,KNIFE/LASER      Followed by LEEP-CIS     HYSTERECTOMY, PAP STILL INDICATED  2004    C-Hyst (supracervical) for placenta accreta     SURGICAL HISTORY OF -        x3     SURGICAL HISTORY OF -       Breast reduction surgery       Social History     Tobacco Use     Smoking status: Never Smoker     Smokeless tobacco: Never Used   Substance Use Topics     Alcohol use: Yes     Comment: minimal     Family History   Problem Relation Age of Onset     Obesity Father      Diabetes Father      Hypertension Father         65     Cerebrovascular Disease Maternal Grandmother      Cardiovascular Paternal Grandmother      Diabetes Paternal Grandfather      Depression Mother      Colon Cancer No family hx of      Coronary Artery Disease No family hx of          Current Outpatient Medications   Medication Sig Dispense Refill     lisinopril (ZESTRIL) 10 MG tablet Take 1 tablet (10 mg) by mouth daily 90 tablet 3     No Known Allergies    Reviewed and updated as needed this visit by Provider       Review of Systems   ROS COMP: Constitutional, HEENT, cardiovascular, pulmonary, gi and gu systems are negative, except as otherwise noted.      Objective    LMP 2003   Estimated body mass index is 34.84 kg/m  as calculated from the following:    Height as of 3/4/20: 1.562 m (5' 1.5\").    Weight as of 3/4/20: 85 kg (187 lb 6.4 oz).  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 She denies any symptoms of HTN, or symptoms of low-blood pressure. For now, we will continue low-dose Lisinopril. Encouraged her to get a blood pressure cuff at her local pharmacy and call us with her BP readings at home. If not, she will come to the clinic for a BP check in 1-2 months time.   Plan: Get home BP cuff. If not, RETURN TO CLINIC for BP check and adjustments to her medication.      Return in about 4 weeks (around 5/18/2020) for BP Recheck.     Telephone End Time: 9:09 AM   Total Time Spent: 10 minutes    Jayjay Cintron PA-C

## 2020-06-24 ENCOUNTER — ANCILLARY PROCEDURE (OUTPATIENT)
Dept: MAMMOGRAPHY | Facility: CLINIC | Age: 50
End: 2020-06-24
Attending: PHYSICIAN ASSISTANT
Payer: COMMERCIAL

## 2020-06-24 DIAGNOSIS — Z12.31 ENCOUNTER FOR SCREENING MAMMOGRAM FOR BREAST CANCER: ICD-10-CM

## 2020-06-24 PROCEDURE — 77067 SCR MAMMO BI INCL CAD: CPT | Mod: TC

## 2020-06-24 PROCEDURE — 77063 BREAST TOMOSYNTHESIS BI: CPT | Mod: TC

## 2020-10-08 ENCOUNTER — TELEPHONE (OUTPATIENT)
Dept: FAMILY MEDICINE | Facility: CLINIC | Age: 50
End: 2020-10-08

## 2020-10-08 NOTE — TELEPHONE ENCOUNTER
Panel Management Review      Patient has the following on her problem list:     Depression / Dysthymia review    Measure:  Needs PHQ-9 score of 4 or less during index window.  Administer PHQ-9 and if score is 5 or more, send encounter to provider for next steps.    5 - 7 month window range:     PHQ-9 SCORE 11/27/2012 8/30/2019 3/4/2020   PHQ-9 Total Score 9 - -   PHQ-9 Total Score - 2 0       If PHQ-9 recheck is 5 or more, route to provider for next steps.    Patient is due for:  PHQ9    Hypertension   Last three blood pressure readings:  BP Readings from Last 3 Encounters:   03/04/20 (!) 152/86   02/25/20 (!) 160/90   08/30/19 120/80     Blood pressure: FAILED    HTN Guidelines:  Less than 140/90      Composite cancer screening  Chart review shows that this patient is due/due soon for the following Colonoscopy  Summary:    Patient is due/failing the following:   BP CHECK and PHQ9    Action needed:   Patient needs nurse only appointment.    Type of outreach:    Phone, spoke to patient.  Patient states that she is able to have blood pressure checked at work. Patient agreed to having bp checked regularly over the next few weeks and then calling us with those readings so that we can update her chart.     Questions for provider review:    None                                                                                                                                    Yael Gonzalez CMA       Chart routed to Care Team .

## 2020-11-10 ENCOUNTER — ALLIED HEALTH/NURSE VISIT (OUTPATIENT)
Dept: FAMILY MEDICINE | Facility: CLINIC | Age: 50
End: 2020-11-10
Payer: COMMERCIAL

## 2020-11-10 VITALS — DIASTOLIC BLOOD PRESSURE: 88 MMHG | SYSTOLIC BLOOD PRESSURE: 138 MMHG

## 2020-11-10 DIAGNOSIS — I10 ESSENTIAL HYPERTENSION: Primary | ICD-10-CM

## 2020-11-10 PROCEDURE — 99207 PR NO CHARGE LOS: CPT | Performed by: PHYSICIAN ASSISTANT

## 2020-11-10 NOTE — TELEPHONE ENCOUNTER
Reached out to patient. States that she has her blood pressure checked at work.     Last blood pressure was 10/08/2020. Patient reported that her blood pressure was 138/88. Added this reading to patients chart.   Yael Gonzalez CMA

## 2020-11-16 ENCOUNTER — HEALTH MAINTENANCE LETTER (OUTPATIENT)
Age: 50
End: 2020-11-16

## 2021-05-17 DIAGNOSIS — I10 HYPERTENSION, UNSPECIFIED TYPE: ICD-10-CM

## 2021-05-18 RX ORDER — LISINOPRIL 10 MG/1
TABLET ORAL
Qty: 30 TABLET | Refills: 0 | Status: SHIPPED | OUTPATIENT
Start: 2021-05-18 | End: 2021-05-26

## 2021-05-25 DIAGNOSIS — I10 HYPERTENSION, UNSPECIFIED TYPE: ICD-10-CM

## 2021-05-26 RX ORDER — LISINOPRIL 10 MG/1
TABLET ORAL
Qty: 30 TABLET | Refills: 0 | Status: SHIPPED | OUTPATIENT
Start: 2021-05-26 | End: 2021-07-20

## 2021-07-20 ENCOUNTER — OFFICE VISIT (OUTPATIENT)
Dept: FAMILY MEDICINE | Facility: CLINIC | Age: 51
End: 2021-07-20
Payer: COMMERCIAL

## 2021-07-20 VITALS
TEMPERATURE: 98.1 F | BODY MASS INDEX: 31.98 KG/M2 | OXYGEN SATURATION: 98 % | RESPIRATION RATE: 18 BRPM | HEIGHT: 62 IN | WEIGHT: 173.8 LBS | HEART RATE: 63 BPM | SYSTOLIC BLOOD PRESSURE: 130 MMHG | DIASTOLIC BLOOD PRESSURE: 86 MMHG

## 2021-07-20 DIAGNOSIS — Z11.4 ENCOUNTER FOR SCREENING FOR HIV: ICD-10-CM

## 2021-07-20 DIAGNOSIS — F32.0 MILD MAJOR DEPRESSION (H): ICD-10-CM

## 2021-07-20 DIAGNOSIS — Z00.00 ROUTINE GENERAL MEDICAL EXAMINATION AT A HEALTH CARE FACILITY: Primary | ICD-10-CM

## 2021-07-20 DIAGNOSIS — I10 ESSENTIAL HYPERTENSION: ICD-10-CM

## 2021-07-20 DIAGNOSIS — Z12.31 ENCOUNTER FOR SCREENING MAMMOGRAM FOR BREAST CANCER: ICD-10-CM

## 2021-07-20 DIAGNOSIS — Z11.59 NEED FOR HEPATITIS C SCREENING TEST: ICD-10-CM

## 2021-07-20 DIAGNOSIS — Z13.6 CARDIOVASCULAR SCREENING; LDL GOAL LESS THAN 130: ICD-10-CM

## 2021-07-20 DIAGNOSIS — D48.5 NEOPLASM OF UNCERTAIN BEHAVIOR OF SKIN OF UPPER ARM: ICD-10-CM

## 2021-07-20 DIAGNOSIS — L91.0 KELOID SCAR: ICD-10-CM

## 2021-07-20 DIAGNOSIS — Z12.11 SPECIAL SCREENING FOR MALIGNANT NEOPLASMS, COLON: ICD-10-CM

## 2021-07-20 PROCEDURE — 99214 OFFICE O/P EST MOD 30 MIN: CPT | Mod: 25 | Performed by: PHYSICIAN ASSISTANT

## 2021-07-20 PROCEDURE — 99396 PREV VISIT EST AGE 40-64: CPT | Performed by: PHYSICIAN ASSISTANT

## 2021-07-20 ASSESSMENT — ENCOUNTER SYMPTOMS
WEAKNESS: 0
MYALGIAS: 1
DIZZINESS: 0
HEADACHES: 0
SHORTNESS OF BREATH: 0
DIARRHEA: 0
SORE THROAT: 0
HEARTBURN: 0
HEMATOCHEZIA: 0
FREQUENCY: 0
NERVOUS/ANXIOUS: 0
PARESTHESIAS: 0
BREAST MASS: 0
CHILLS: 0
EYE PAIN: 0
FEVER: 0
HEMATURIA: 0
COUGH: 0
CONSTIPATION: 0
PALPITATIONS: 0
DYSURIA: 0
NAUSEA: 0
ABDOMINAL PAIN: 0
ARTHRALGIAS: 0
JOINT SWELLING: 0

## 2021-07-20 ASSESSMENT — PATIENT HEALTH QUESTIONNAIRE - PHQ9: SUM OF ALL RESPONSES TO PHQ QUESTIONS 1-9: 0

## 2021-07-20 ASSESSMENT — MIFFLIN-ST. JEOR: SCORE: 1352.63

## 2021-07-20 NOTE — PROGRESS NOTES
SUBJECTIVE:   CC: Alis Norris is an 50 year old woman who presents for preventive health visit.     Patient has been advised of split billing requirements and indicates understanding: Yes     Healthy Habits:     Getting at least 3 servings of Calcium per day:  NO    Bi-annual eye exam:  Yes    Dental care twice a year:  Yes    Sleep apnea or symptoms of sleep apnea:  Excessive snoring    Diet:  Regular (no restrictions)    Frequency of exercise:  None    Taking medications regularly:  Yes    Medication side effects:  None    PHQ-2 Total Score: 0    Additional concerns today:  No    Patient is concerned about a large scar on the left upper arm.  She would like this evaluated to possibly reduce the size of the scar.  She also has another mole on the left upper arm that she would like looked at.    She notes that she stopped her blood pressure medicine since she is lost 12 and half pounds.  She has been taking her blood pressure at home with good improvement in her blood pressure.  She would like to continue lifestyle changes for this.    Today's PHQ-2 Score:   PHQ-2 ( 1999 Pfizer) 7/20/2021   Q1: Little interest or pleasure in doing things 0   Q2: Feeling down, depressed or hopeless 0   PHQ-2 Score 0   Q1: Little interest or pleasure in doing things Not at all   Q2: Feeling down, depressed or hopeless Not at all   PHQ-2 Score 0     Abuse: Current or Past (Physical, Sexual or Emotional) - No  Do you feel safe in your environment? Yes    Have you ever done Advance Care Planning? (For example, a Health Directive, POLST, or a discussion with a medical provider or your loved ones about your wishes): No, advance care planning information given to patient to review.  Patient plans to discuss their wishes with loved ones or provider.      Social History     Tobacco Use     Smoking status: Never Smoker     Smokeless tobacco: Never Used   Substance Use Topics     Alcohol use: Yes     Comment: minimal     If you drink  alcohol do you typically have >3 drinks per day or >7 drinks per week? No    Alcohol Use 7/20/2021   Prescreen: >3 drinks/day or >7 drinks/week? No   Prescreen: >3 drinks/day or >7 drinks/week? -   No flowsheet data found.    Reviewed orders with patient.  Reviewed health maintenance and updated orders accordingly - Yes  Lab work is in process    Breast Cancer Screening:    Breast CA Risk Assessment (FHS-7) 7/20/2021   Do you have a family history of breast, colon, or ovarian cancer? No / Unknown     Mammogram Screening: Recommended annual mammography  Pertinent mammograms are reviewed under the imaging tab.    History of abnormal Pap smear: NO - age 30-65 PAP every 5 years with negative HPV co-testing recommended  PAP / HPV Latest Ref Rng & Units 8/30/2019 2/13/2017 11/18/2014   PAP - NIL NIL NIL   HPV 16 DNA NEG:Negative Negative Negative -   HPV 18 DNA NEG:Negative Negative Negative -   OTHER HR HPV NEG:Negative Negative Negative -     Reviewed and updated as needed this visit by clinical staff  Tobacco  Allergies  Meds  Problems  Med Hx  Surg Hx  Fam Hx  Soc Hx          Reviewed and updated as needed this visit by Provider  Tobacco  Allergies  Meds  Problems  Med Hx  Surg Hx  Fam Hx         Review of Systems   Constitutional: Negative for chills and fever.   HENT: Negative for congestion, ear pain, hearing loss and sore throat.    Eyes: Negative for pain and visual disturbance.   Respiratory: Negative for cough and shortness of breath.    Cardiovascular: Negative for chest pain, palpitations and peripheral edema.   Gastrointestinal: Negative for abdominal pain, constipation, diarrhea, heartburn, hematochezia and nausea.   Breasts:  Positive for tenderness. Negative for breast mass and discharge.   Genitourinary: Negative for dysuria, frequency, genital sores, hematuria, pelvic pain, urgency, vaginal bleeding and vaginal discharge.   Musculoskeletal: Positive for myalgias. Negative for arthralgias  "and joint swelling.   Skin: Negative for rash.   Neurological: Negative for dizziness, weakness, headaches and paresthesias.   Psychiatric/Behavioral: Negative for mood changes. The patient is not nervous/anxious.       OBJECTIVE:   /86 (BP Location: Right arm, Patient Position: Sitting, Cuff Size: Adult Regular)   Pulse 63   Temp 98.1  F (36.7  C) (Tympanic)   Resp 18   Ht 1.568 m (5' 1.75\")   Wt 78.8 kg (173 lb 12.8 oz)   LMP 03/01/2003   SpO2 98%   BMI 32.05 kg/m    Physical Exam  Constitutional: healthy, alert, and no distress  Head: Normocephalic. Atraumatic  Eyes: No conjunctival injection, sclera anicteric  Neck: supple, no thyromegaly, nodules or asymmetry of the thyroid. No cervical LAD.  Cardiovascular: RRR. No murmurs, clicks, gallops, or rubs. No peripheral edema.   Respiratory: No resp distress. Lungs CTAB bilaterally.   Musculoskeletal: extremities normal- no gross deformities noted, and normal muscle tone  Skin: no suspicious lesions or rashes  Neurologic: Gait normal. CN 2-12 grossly intact  Psychiatric: mentation appears normal and affect normal/bright     Diagnostic Test Results:  none     ASSESSMENT/PLAN:   Routine general medical examination at a health care facility  Healthy 50 yr old here for physical exam. Last physical exam done 2-3 years ago. Discussed preventative screenings which are updated below. Counseled on immunizations and healthy lifestyle. Follow-up in 1 year for repeat physical exam.     Encounter for screening mammogram for breast cancer  No history of abnormal mammograms.  Due for screening. Ordered today.   - MA Screen Bilateral w/Levi; Future    Need for hepatitis C screening test  Never had screening before ordered today.  - Hepatitis C Screen Reflex to HCV RNA Quant and Genotype; Future    Special screening for malignant neoplasms, colon  Due for colon cancer screening.  She has never had screening before.  Colonoscopy ordered.  - Adult Gastro Ref - Procedure " "Only; Future    Encounter for screening for HIV  Never had screening for HIV.  Ordered today.  - HIV Antigen Antibody Combo; Future    CARDIOVASCULAR SCREENING; LDL GOAL LESS THAN 130  Cholesterol elevated 2 years ago.  She is working on lifestyle changes.  Recheck lipid panel.  - Lipid panel reflex to direct LDL Fasting; Future    Essential hypertension  Normotensive today in clinic.  She has stopped her lisinopril since she has lost weight.  She notes that her home blood pressures have been normal as well.  She will continue lifestyle changes and we will discontinue her lisinopril and recheck in 3 months.    Mild major depression (H)  Stable and very well controlled with lifestyle changes.  PHQ done today which was 0.    Keloid scar  There is a keloid/hypertrophic scar located on the left upper arm that the patient would like evaluated by dermatology to see if they can decrease the size of the scar.  Derm referral ordered for further management.  - Adult Dermatology Referral; Future    Neoplasm of uncertain behavior of skin of upper arm  Changing, enlarging nevus on the L upper arm. Recommend excision. Pt will schedule follow-up appointment for this.     Patient has been advised of split billing requirements and indicates understanding: Yes  COUNSELING:  Reviewed preventive health counseling, as reflected in patient instructions       Regular exercise       Healthy diet/nutrition       Vision screening       Immunizations       Colon cancer screening       Consider Hep C screening for all patients one time for ages 18-79 years       HIV screeninx in teen years, 1x in adult years, and at intervals if high risk    Estimated body mass index is 32.05 kg/m  as calculated from the following:    Height as of this encounter: 1.568 m (5' 1.75\").    Weight as of this encounter: 78.8 kg (173 lb 12.8 oz).    Weight management plan: Discussed healthy diet and exercise guidelines    She reports that she has never smoked. " She has never used smokeless tobacco.    Jayjay Cintron PA-C  [facility]

## 2021-07-20 NOTE — PATIENT INSTRUCTIONS
Follow-up with Dermatology about your scar and excision of the mole on your left arm. If you would like me to remove the mole in our clinic, just go ahead and schedule an appointment with me.     Come in for lab work in 1-2 months. We are checking cholesterol, HIV, Hep C.     Preventive Health Recommendations  Female Ages 50 - 64    Yearly exam: See your health care provider every year in order to  o Review health changes.   o Discuss preventive care.    o Review your medicines if your doctor has prescribed any.      Get a Pap test every three years (unless you have an abnormal result and your provider advises testing more often).    If you get Pap tests with HPV test, you only need to test every 5 years, unless you have an abnormal result.     You do not need a Pap test if your uterus was removed (hysterectomy) and you have not had cancer.    You should be tested each year for STDs (sexually transmitted diseases) if you're at risk.     Have a mammogram every 1 to 2 years.    Have a colonoscopy at age 50, or have a yearly FIT test (stool test). These exams screen for colon cancer.      Have a cholesterol test every 5 years, or more often if advised.    Have a diabetes test (fasting glucose) every three years. If you are at risk for diabetes, you should have this test more often.     If you are at risk for osteoporosis (brittle bone disease), think about having a bone density scan (DEXA).    Shots: Get a flu shot each year. Get a tetanus shot every 10 years.    Nutrition:     Eat at least 5 servings of fruits and vegetables each day.    Eat whole-grain bread, whole-wheat pasta and brown rice instead of white grains and rice.    Get adequate Calcium and Vitamin D.     Lifestyle    Exercise at least 150 minutes a week (30 minutes a day, 5 days a week). This will help you control your weight and prevent disease.    Limit alcohol to one drink per day.    No smoking.     Wear sunscreen to prevent skin cancer.     See your  dentist every six months for an exam and cleaning.    See your eye doctor every 1 to 2 years.

## 2021-08-30 ENCOUNTER — ANCILLARY PROCEDURE (OUTPATIENT)
Dept: MAMMOGRAPHY | Facility: CLINIC | Age: 51
End: 2021-08-30
Attending: PHYSICIAN ASSISTANT
Payer: COMMERCIAL

## 2021-08-30 DIAGNOSIS — Z12.31 ENCOUNTER FOR SCREENING MAMMOGRAM FOR BREAST CANCER: ICD-10-CM

## 2021-08-30 PROCEDURE — 77067 SCR MAMMO BI INCL CAD: CPT | Mod: TC | Performed by: RADIOLOGY

## 2021-08-30 PROCEDURE — 77063 BREAST TOMOSYNTHESIS BI: CPT | Mod: TC | Performed by: RADIOLOGY

## 2021-09-18 ENCOUNTER — HEALTH MAINTENANCE LETTER (OUTPATIENT)
Age: 51
End: 2021-09-18

## 2022-08-05 ENCOUNTER — IMMUNIZATION (OUTPATIENT)
Dept: FAMILY MEDICINE | Facility: CLINIC | Age: 52
End: 2022-08-05

## 2022-08-05 PROCEDURE — 0064A COVID-19,PF,MODERNA (18+ YRS BOOSTER .25ML): CPT

## 2022-08-05 PROCEDURE — 91306 COVID-19,PF,MODERNA (18+ YRS BOOSTER .25ML): CPT

## 2022-11-19 ENCOUNTER — HEALTH MAINTENANCE LETTER (OUTPATIENT)
Age: 52
End: 2022-11-19

## 2023-02-13 ENCOUNTER — OFFICE VISIT (OUTPATIENT)
Dept: FAMILY MEDICINE | Facility: CLINIC | Age: 53
End: 2023-02-13
Payer: COMMERCIAL

## 2023-02-13 VITALS
TEMPERATURE: 97 F | DIASTOLIC BLOOD PRESSURE: 92 MMHG | WEIGHT: 159 LBS | SYSTOLIC BLOOD PRESSURE: 136 MMHG | HEART RATE: 58 BPM | BODY MASS INDEX: 30.02 KG/M2 | OXYGEN SATURATION: 100 % | HEIGHT: 61 IN | RESPIRATION RATE: 12 BRPM

## 2023-02-13 DIAGNOSIS — E78.5 HYPERLIPIDEMIA LDL GOAL <160: ICD-10-CM

## 2023-02-13 DIAGNOSIS — G89.29 CHRONIC UPPER BACK PAIN: ICD-10-CM

## 2023-02-13 DIAGNOSIS — I10 ESSENTIAL HYPERTENSION: ICD-10-CM

## 2023-02-13 DIAGNOSIS — M54.9 CHRONIC UPPER BACK PAIN: ICD-10-CM

## 2023-02-13 DIAGNOSIS — Z12.4 CERVICAL CANCER SCREENING: ICD-10-CM

## 2023-02-13 DIAGNOSIS — Z12.11 SCREEN FOR COLON CANCER: ICD-10-CM

## 2023-02-13 DIAGNOSIS — Z12.31 VISIT FOR SCREENING MAMMOGRAM: ICD-10-CM

## 2023-02-13 DIAGNOSIS — Z11.59 NEED FOR HEPATITIS C SCREENING TEST: ICD-10-CM

## 2023-02-13 DIAGNOSIS — Z00.00 ROUTINE GENERAL MEDICAL EXAMINATION AT A HEALTH CARE FACILITY: Primary | ICD-10-CM

## 2023-02-13 DIAGNOSIS — Z11.4 SCREENING FOR HIV (HUMAN IMMUNODEFICIENCY VIRUS): ICD-10-CM

## 2023-02-13 LAB
ANION GAP SERPL CALCULATED.3IONS-SCNC: 14 MMOL/L (ref 7–15)
BUN SERPL-MCNC: 12.6 MG/DL (ref 6–20)
CALCIUM SERPL-MCNC: 9.3 MG/DL (ref 8.6–10)
CHLORIDE SERPL-SCNC: 104 MMOL/L (ref 98–107)
CHOLEST SERPL-MCNC: 191 MG/DL
CREAT SERPL-MCNC: 0.81 MG/DL (ref 0.51–0.95)
DEPRECATED HCO3 PLAS-SCNC: 24 MMOL/L (ref 22–29)
GFR SERPL CREATININE-BSD FRML MDRD: 86 ML/MIN/1.73M2
GLUCOSE SERPL-MCNC: 85 MG/DL (ref 70–99)
HDLC SERPL-MCNC: 79 MG/DL
LDLC SERPL CALC-MCNC: 96 MG/DL
NONHDLC SERPL-MCNC: 112 MG/DL
POTASSIUM SERPL-SCNC: 3.7 MMOL/L (ref 3.4–5.3)
SODIUM SERPL-SCNC: 142 MMOL/L (ref 136–145)
TRIGL SERPL-MCNC: 78 MG/DL

## 2023-02-13 PROCEDURE — 90715 TDAP VACCINE 7 YRS/> IM: CPT | Performed by: PHYSICIAN ASSISTANT

## 2023-02-13 PROCEDURE — 36415 COLL VENOUS BLD VENIPUNCTURE: CPT | Performed by: PHYSICIAN ASSISTANT

## 2023-02-13 PROCEDURE — 80048 BASIC METABOLIC PNL TOTAL CA: CPT | Performed by: PHYSICIAN ASSISTANT

## 2023-02-13 PROCEDURE — 99213 OFFICE O/P EST LOW 20 MIN: CPT | Mod: 25 | Performed by: PHYSICIAN ASSISTANT

## 2023-02-13 PROCEDURE — 87389 HIV-1 AG W/HIV-1&-2 AB AG IA: CPT | Performed by: PHYSICIAN ASSISTANT

## 2023-02-13 PROCEDURE — 80061 LIPID PANEL: CPT | Performed by: PHYSICIAN ASSISTANT

## 2023-02-13 PROCEDURE — 86803 HEPATITIS C AB TEST: CPT | Performed by: PHYSICIAN ASSISTANT

## 2023-02-13 PROCEDURE — 87624 HPV HI-RISK TYP POOLED RSLT: CPT | Performed by: PHYSICIAN ASSISTANT

## 2023-02-13 PROCEDURE — 99396 PREV VISIT EST AGE 40-64: CPT | Mod: 25 | Performed by: PHYSICIAN ASSISTANT

## 2023-02-13 PROCEDURE — 90471 IMMUNIZATION ADMIN: CPT | Performed by: PHYSICIAN ASSISTANT

## 2023-02-13 PROCEDURE — G0145 SCR C/V CYTO,THINLAYER,RESCR: HCPCS | Performed by: PHYSICIAN ASSISTANT

## 2023-02-13 ASSESSMENT — ENCOUNTER SYMPTOMS
NERVOUS/ANXIOUS: 0
PARESTHESIAS: 0
MYALGIAS: 0
ARTHRALGIAS: 0
DYSURIA: 0
COUGH: 0
ABDOMINAL PAIN: 0
WEAKNESS: 0
HEMATOCHEZIA: 0
HEADACHES: 0
NAUSEA: 0
HEARTBURN: 0
HEMATURIA: 0
DIARRHEA: 0
JOINT SWELLING: 0
CONSTIPATION: 0
CHILLS: 0
SORE THROAT: 0
EYE PAIN: 0
SHORTNESS OF BREATH: 0
FEVER: 0
DIZZINESS: 0
PALPITATIONS: 0
FREQUENCY: 0

## 2023-02-13 ASSESSMENT — PATIENT HEALTH QUESTIONNAIRE - PHQ9
SUM OF ALL RESPONSES TO PHQ QUESTIONS 1-9: 0
SUM OF ALL RESPONSES TO PHQ QUESTIONS 1-9: 0
10. IF YOU CHECKED OFF ANY PROBLEMS, HOW DIFFICULT HAVE THESE PROBLEMS MADE IT FOR YOU TO DO YOUR WORK, TAKE CARE OF THINGS AT HOME, OR GET ALONG WITH OTHER PEOPLE: NOT DIFFICULT AT ALL

## 2023-02-13 ASSESSMENT — PAIN SCALES - GENERAL: PAINLEVEL: NO PAIN (0)

## 2023-02-13 NOTE — NURSING NOTE
"Chief Complaint   Patient presents with     Physical     BP (!) 136/92   Pulse 58   Temp 97  F (36.1  C) (Tympanic)   Resp 12   Ht 1.556 m (5' 1.25\")   Wt 72.1 kg (159 lb)   LMP 03/01/2003   SpO2 100%   BMI 29.80 kg/m   Estimated body mass index is 29.8 kg/m  as calculated from the following:    Height as of this encounter: 1.556 m (5' 1.25\").    Weight as of this encounter: 72.1 kg (159 lb).  Patient presents to the clinic using No DME      Health Maintenance that is potentially due pending provider review:    Health Maintenance Due   Topic Date Due     ANNUAL REVIEW OF HM ORDERS  Never done     HEPATITIS B IMMUNIZATION (1 of 3 - 3-dose series) Never done     COLORECTAL CANCER SCREENING  Never done     HIV SCREENING  Never done     HEPATITIS C SCREENING  Never done     ZOSTER IMMUNIZATION (1 of 2) Never done     YEARLY PREVENTIVE VISIT  07/20/2022     HPV TEST  08/30/2022     MAMMO SCREENING  08/30/2022     PAP  08/30/2022     COVID-19 Vaccine (4 - Booster for Moderna series) 09/30/2022     DTAP/TDAP/TD IMMUNIZATION (3 - Td or Tdap) 10/22/2022        n/a        "

## 2023-02-13 NOTE — PATIENT INSTRUCTIONS
Lab work today.     All preventative screenings are ordered.     Updated TDAP shot.     Follow-up with skin biopsy.     Preventive Health Recommendations  Female Ages 50 - 64    Yearly exam: See your health care provider every year in order to  Review health changes.   Discuss preventive care.    Review your medicines if your doctor has prescribed any.    Get a Pap test every three years (unless you have an abnormal result and your provider advises testing more often).  If you get Pap tests with HPV test, you only need to test every 5 years, unless you have an abnormal result.   You do not need a Pap test if your uterus was removed (hysterectomy) and you have not had cancer.  You should be tested each year for STDs (sexually transmitted diseases) if you're at risk.   Have a mammogram every 1 to 2 years.  Have a colonoscopy at age 50, or have a yearly FIT test (stool test). These exams screen for colon cancer.    Have a cholesterol test every 5 years, or more often if advised.  Have a diabetes test (fasting glucose) every three years. If you are at risk for diabetes, you should have this test more often.   If you are at risk for osteoporosis (brittle bone disease), think about having a bone density scan (DEXA).    Shots: Get a flu shot each year. Get a tetanus shot every 10 years.    Nutrition:   Eat at least 5 servings of fruits and vegetables each day.  Eat whole-grain bread, whole-wheat pasta and brown rice instead of white grains and rice.  Get adequate Calcium and Vitamin D.     Lifestyle  Exercise at least 150 minutes a week (30 minutes a day, 5 days a week). This will help you control your weight and prevent disease.  Limit alcohol to one drink per day.  No smoking.   Wear sunscreen to prevent skin cancer.   See your dentist every six months for an exam and cleaning.  See your eye doctor every 1 to 2 years.

## 2023-02-13 NOTE — PROGRESS NOTES
SUBJECTIVE:   CC: Veronica is an 52 year old who presents for preventive health visit.     Patient has been advised of split billing requirements and indicates understanding: Yes  Healthy Habits:     Getting at least 3 servings of Calcium per day:  NO    Bi-annual eye exam:  Yes    Dental care twice a year:  Yes    Sleep apnea or symptoms of sleep apnea:  None    Diet:  Other    Frequency of exercise:  None    Taking medications regularly:  Yes    Medication side effects:  Not applicable    PHQ-2 Total Score: 0    Additional concerns today:  No    Diet: regular  Exercise: none  Alcohol: rarely, 1 glass every 2 months  Tobacco use: none  Eye exams yearly, dental exams twice yearly    Today's PHQ-2 Score:   PHQ-2 ( 1999 Pfizer) 2/13/2023   Q1: Little interest or pleasure in doing things 0   Q2: Feeling down, depressed or hopeless 0   PHQ-2 Score 0   PHQ-2 Total Score (12-17 Years)- Positive if 3 or more points; Administer PHQ-A if positive -   Q1: Little interest or pleasure in doing things Not at all   Q2: Feeling down, depressed or hopeless Not at all   PHQ-2 Score 0     Social History     Tobacco Use     Smoking status: Never     Smokeless tobacco: Never   Substance Use Topics     Alcohol use: Yes     Comment: minimal     If you drink alcohol do you typically have >3 drinks per day or >7 drinks per week? No    Alcohol Use 2/13/2023   Prescreen: >3 drinks/day or >7 drinks/week? No   Prescreen: >3 drinks/day or >7 drinks/week? -       Reviewed orders with patient.  Reviewed health maintenance and updated orders accordingly - Yes  Lab work is in process    Breast Cancer Screening:    Breast CA Risk Assessment (FHS-7) 7/20/2021   Do you have a family history of breast, colon, or ovarian cancer? No / Unknown     Pertinent mammograms are reviewed under the imaging tab.    History of abnormal Pap smear:   PAP / HPV Latest Ref Rng & Units 2/13/2023 8/30/2019 2/13/2017   PAP   Negative for Intraepithelial Lesion or Malignancy  "(NILM) - -   PAP (Historical) - - NIL NIL   HPV16 Negative Negative Negative Negative   HPV18 Negative Negative Negative Negative   HRHPV Negative Negative Negative Negative     Reviewed and updated as needed this visit by clinical staff   Tobacco  Allergies  Meds  Problems  Med Hx  Surg Hx  Fam Hx            Review of Systems   Constitutional: Negative for chills and fever.   HENT: Negative for congestion, ear pain, hearing loss and sore throat.    Eyes: Positive for visual disturbance. Negative for pain.   Respiratory: Negative for cough and shortness of breath.    Cardiovascular: Negative for chest pain, palpitations and peripheral edema.   Gastrointestinal: Negative for abdominal pain, constipation, diarrhea, heartburn, hematochezia and nausea.   Genitourinary: Negative for dysuria, frequency, genital sores, hematuria and urgency.   Musculoskeletal: Negative for arthralgias, joint swelling and myalgias.   Skin: Negative for rash.   Neurological: Negative for dizziness, weakness, headaches and paresthesias.   Psychiatric/Behavioral: Negative for mood changes. The patient is not nervous/anxious.         OBJECTIVE:   BP (!) 136/92   Pulse 58   Temp 97  F (36.1  C) (Tympanic)   Resp 12   Ht 1.556 m (5' 1.25\")   Wt 72.1 kg (159 lb)   LMP 03/01/2003   SpO2 100%   BMI 29.80 kg/m    Physical Exam  GENERAL: healthy, alert and no distress  EYES: Eyes grossly normal to inspection, PERRL and conjunctivae and sclerae normal  HENT: ear canals and TM's normal, nose and mouth without ulcers or lesions  NECK: no adenopathy, no asymmetry, masses, or scars and thyroid normal to palpation  RESP: lungs clear to auscultation - no rales, rhonchi or wheezes  BREAST: normal without masses, tenderness or nipple discharge and no palpable axillary masses or adenopathy  CV: regular rate and rhythm, normal S1 S2, no S3 or S4, no murmur, click or rub, no peripheral edema and peripheral pulses strong  ABDOMEN: soft, nontender, " no hepatosplenomegaly, no masses and bowel sounds normal   (female): normal female external genitalia, normal urethral meatus, vaginal mucosa pink, moist, well rugated, and normal cervix without masses or discharge  MS: no gross musculoskeletal defects noted, no edema  SKIN: ~1 cm nevus on left upper arm. no suspicious lesions or rashes  NEURO: Normal upper and lower extremity strength and tone, mentation intact and speech normal  PSYCH: mentation appears normal, affect normal/bright    Diagnostic Test Results:  Labs reviewed in Epic    ASSESSMENT/PLAN:   (Z00.00) Routine general medical examination at a health care facility  (primary encounter diagnosis)  52 year old here for physical exam. Last physical exam done 1.5 years ago. Discussed preventative screenings which are updated below. Counseled on immunizations (Tetnus received today) and healthy lifestyle. Follow-up in 1 year for repeat physical exam.    (Z12.11) Screen for colon cancer  Plan: Colonoscopy Screening  Referral    (Z11.4) Screening for HIV (human immunodeficiency virus)  Plan: HIV Antigen Antibody Combo    (Z11.59) Need for hepatitis C screening test  Plan: Hepatitis C Screen Reflex to HCV RNA Quant and         Genotype    (Z12.4) Cervical cancer screening  Plan: Pap Screen with HPV - recommended age 30 - 65         years    (Z12.31) Visit for screening mammogram  Due for screening. No history of abnormal mammograms.   Plan: MA SCREENING DIGITAL BILAT - Future  (s+30)    (M54.9,  G89.29) Chronic upper back pain  Patient reports chronic upper back pain. History of similar symptoms ~ 32 years ago and received a breast augmentation. Refer back to plastic surgery due to similar pain and patient wishes for another augmentation.   Plan: Adult Plastic Surgery  Referral    (E78.5) Hyperlipidemia LDL goal <160  Lipids were elevated in 2019. ASCVD risk was low and statin was not indicated.  Recheck today.  Plan: Lipid panel reflex to  direct LDL Fasting    (I10) Essential hypertension  Mildly elevated BP in clinic today. Historically has been on Lisinopril for HTN but stopped 1.5 years ago since she lost weight. Has been generally well controlled with diet and exercise. Recommended to continue lifestyle changes. BMP pending. Recommended to recheck in clinic in 1-2 months.   Plan: Basic metabolic panel  (Ca, Cl, CO2, Creat,         Gluc, K, Na, BUN)    Patient has been advised of split billing requirements and indicates understanding: Yes    COUNSELING:  Reviewed preventive health counseling, as reflected in patient instructions    She reports that she has never smoked. She has never used smokeless tobacco.    JANA Stock Student  [facility]  February 13, 2023     Physician Attestation   I, Jayjay Cintron PA-C, was present with the medical/MADELIN student who participated in the service and in the documentation of the note.  I have verified the history and personally performed the physical exam and medical decision making.  I agree with the assessment and plan of care as documented in the note.    Jayjay Cintron PA-C

## 2023-02-14 LAB
HCV AB SERPL QL IA: NONREACTIVE
HIV 1+2 AB+HIV1 P24 AG SERPL QL IA: NONREACTIVE

## 2023-02-16 LAB
BKR LAB AP GYN ADEQUACY: NORMAL
BKR LAB AP GYN INTERPRETATION: NORMAL
BKR LAB AP HPV REFLEX: NORMAL
BKR LAB AP PREVIOUS ABNORMAL: NORMAL
PATH REPORT.COMMENTS IMP SPEC: NORMAL
PATH REPORT.COMMENTS IMP SPEC: NORMAL
PATH REPORT.RELEVANT HX SPEC: NORMAL

## 2023-02-17 LAB
HUMAN PAPILLOMA VIRUS 16 DNA: NEGATIVE
HUMAN PAPILLOMA VIRUS 18 DNA: NEGATIVE
HUMAN PAPILLOMA VIRUS FINAL DIAGNOSIS: NORMAL
HUMAN PAPILLOMA VIRUS OTHER HR: NEGATIVE

## 2023-03-29 NOTE — TELEPHONE ENCOUNTER
FUTURE VISIT INFORMATION      FUTURE VISIT INFORMATION:    Date: 6/20/23    Time: 10:00am    Location: [facility] [place]  REFERRAL INFORMATION:    Referring provider:  Jayjay Cintron PA-C    Referring providers clinic:  [facility]    Reason for visit/diagnosis  Chronic upper back pain, breast reduction    RECORDS REQUESTED FROM:       Clinic name Comments Records Status Imaging Status   [facility] OV/referral 2/13/23 EPIC

## 2023-04-04 ENCOUNTER — ANCILLARY PROCEDURE (OUTPATIENT)
Dept: MAMMOGRAPHY | Facility: CLINIC | Age: 53
End: 2023-04-04
Attending: PHYSICIAN ASSISTANT
Payer: COMMERCIAL

## 2023-04-04 DIAGNOSIS — Z12.31 VISIT FOR SCREENING MAMMOGRAM: ICD-10-CM

## 2023-04-04 PROCEDURE — 77063 BREAST TOMOSYNTHESIS BI: CPT | Mod: TC | Performed by: RADIOLOGY

## 2023-04-04 PROCEDURE — 77067 SCR MAMMO BI INCL CAD: CPT | Mod: TC | Performed by: RADIOLOGY

## 2023-04-24 ENCOUNTER — OFFICE VISIT (OUTPATIENT)
Dept: FAMILY MEDICINE | Facility: CLINIC | Age: 53
End: 2023-04-24
Payer: COMMERCIAL

## 2023-04-24 VITALS
RESPIRATION RATE: 16 BRPM | HEART RATE: 65 BPM | SYSTOLIC BLOOD PRESSURE: 136 MMHG | DIASTOLIC BLOOD PRESSURE: 78 MMHG | TEMPERATURE: 97.8 F | OXYGEN SATURATION: 100 % | BODY MASS INDEX: 30.73 KG/M2 | HEIGHT: 61 IN | WEIGHT: 162.8 LBS

## 2023-04-24 DIAGNOSIS — F33.42 RECURRENT MAJOR DEPRESSION IN COMPLETE REMISSION (H): ICD-10-CM

## 2023-04-24 DIAGNOSIS — D22.62: Primary | ICD-10-CM

## 2023-04-24 PROCEDURE — 11104 PUNCH BX SKIN SINGLE LESION: CPT | Performed by: PHYSICIAN ASSISTANT

## 2023-04-24 PROCEDURE — 99213 OFFICE O/P EST LOW 20 MIN: CPT | Mod: 25 | Performed by: PHYSICIAN ASSISTANT

## 2023-04-24 PROCEDURE — 88305 TISSUE EXAM BY PATHOLOGIST: CPT | Performed by: DERMATOLOGY

## 2023-04-24 ASSESSMENT — PAIN SCALES - GENERAL: PAINLEVEL: NO PAIN (0)

## 2023-04-24 NOTE — PROGRESS NOTES
"Assessment & Plan   Atypical nevus of upper arm, left  Punch biopsy done in the usual fashion for an abnormal color, bordered nevus on the LUE.   - Dermatological Path Order and Indications; Standing  - DE PUNCH BIOPSY OF SKIN, FIRST/SINGLE LESION  - lidocaine 1% with EPINEPHrine 1:100,000 injection 2 mL    Recurrent major depression in complete remission (H)  Stable and well controlled with lifestyle changes. Encouraged ongoing healthy lifestyle changes.      BMI:   Estimated body mass index is 30.51 kg/m  as calculated from the following:    Height as of this encounter: 1.556 m (5' 1.25\").    Weight as of this encounter: 73.8 kg (162 lb 12.8 oz).     DARLIN [facility]    Subjective   Veronica is a 52 year old, presenting for the following health issues:  Derm Problem        4/24/2023     4:06 PM   Additional Questions   Roomed by Yael GARCIA CMA     History of Present Illness       Reason for visit:  Mole Removal    She eats 0-1 servings of fruits and vegetables daily.She consumes 0 sweetened beverage(s) daily.She exercises with enough effort to increase her heart rate 9 or less minutes per day.  She exercises with enough effort to increase her heart rate 3 or less days per week.   She is taking medications regularly.     Review of Systems   See HPI       Objective    /78 (BP Location: Right arm, Patient Position: Sitting, Cuff Size: Adult Regular)   Pulse 65   Temp 97.8  F (36.6  C) (Tympanic)   Resp 16   Ht 1.556 m (5' 1.25\")   Wt 73.8 kg (162 lb 12.8 oz)   LMP 03/01/2003   SpO2 100%   BMI 30.51 kg/m    Body mass index is 30.51 kg/m .  Physical Exam   Constitutional: healthy, alert, and no distress  Head: Normocephalic. Atraumatic  Eyes: No conjunctival injection, sclera anicteric  Respiratory: No resp distress.  Musculoskeletal: extremities normal- no gross deformities noted, and normal muscle tone  Neurologic: Gait normal. CN 2-12 grossly intact  Psychiatric: " mentation appears normal and affect normal/bright   Skin: Nevus on the LUE with abnormal color, asymmetric border with interval enlargement since previous exam.     Procedure Note: Skin Biopsy  -Risk benefits discussed with patient.  -Informed consent obtained.  - The area was prepped and appropriately anesthetized with 1.0% lidocaine with epinephrine. Using the usual technique, punch biopsy size 4 mm was performed. An appropriate dressing was applied.   - The procedure was well tolerated and without complications.

## 2023-04-26 LAB
PATH REPORT.COMMENTS IMP SPEC: NORMAL
PATH REPORT.COMMENTS IMP SPEC: NORMAL
PATH REPORT.FINAL DX SPEC: NORMAL
PATH REPORT.GROSS SPEC: NORMAL
PATH REPORT.MICROSCOPIC SPEC OTHER STN: NORMAL
PATH REPORT.RELEVANT HX SPEC: NORMAL

## 2023-05-03 RX ORDER — LIDOCAINE HYDROCHLORIDE AND EPINEPHRINE 10; 10 MG/ML; UG/ML
2 INJECTION, SOLUTION INFILTRATION; PERINEURAL ONCE
Status: COMPLETED | OUTPATIENT
Start: 2023-05-03 | End: 2023-05-03

## 2023-05-03 RX ADMIN — LIDOCAINE HYDROCHLORIDE AND EPINEPHRINE 2 ML: 10; 10 INJECTION, SOLUTION INFILTRATION; PERINEURAL at 15:44

## 2023-06-19 NOTE — PROGRESS NOTES
PLASTIC SURGERY HISTORY AND PHYSICAL    Chief Complaint:Hypertrophy of breasts   Referring Provider: Jayjay Cintron      HPI: Veronica is a 52 year old female who presents with symptomatic macromastia. She currently wears a 38DD, would prefer to be a C. She complains of back pain, neck pain and shoulder pain for many years. She had a breast reduction at 20 years old and it helped with back pain at that time but since then they have gotten larger. Recently had 20lb weight loss and breasts have remained the same size. Does get headaches at time from pain that travels up her neck/back. Wears a bra to bed because pain is worse without a bra. Always wears an underwire for support. Does get intermittent sharp shooting pains in breasts, unsure if related to any activity or time of the month. She has tried the following without successful resolution of pain symptoms: massage, heat packs, chiropractor, pain medication including tylenol/ibuprofen, weight loss. + family history of breast cancer-paternal aunt. No personal breast issues of lumps, bumps or discharge.    Recent mammogram: 4/2023  Rashes: intermittently when it is warm, probably once or twice a summer.     HTN has resolved since weight loss. Did have high blood pressure today but attributes to white coat. Stopped blood pressure meds 1.5 years ago.    PMH:   Past Medical History:   Diagnosis Date     Carcinoma in situ of cervix uteri     s/p LEEP   had pre cancer of cervix- did leep and cone biopsy    PSH:   Past Surgical History:   Procedure Laterality Date     HC CONIZATION CERVIX,KNIFE/LASER  2002     HYSTERECTOMY, PAP STILL INDICATED  1/2004     SURGICAL HISTORY OF -        SURGICAL HISTORY OF -   1991   c section x3  Breast reduction 1991  [place] teeth    FH:   Family History   Problem Relation Age of Onset     Obesity Father      Diabetes Father      Hypertension Father         65     Cerebrovascular Disease Maternal Grandmother      Cardiovascular Paternal  "Grandmother      Diabetes Paternal Grandfather      Depression Mother      Colon Cancer No family hx of      Coronary Artery Disease No family hx of         SH:   Social History     Tobacco Use     Smoking status: Never     Smokeless tobacco: Never   Vaping Use     Vaping status: Never Used   Substance Use Topics     Alcohol use: Yes     Comment: minimal     Drug use: No      Work/school: teacher.    MEDS:   No current outpatient medications on file.       ALLERGIES:   No Known Allergies     ROS: Denies chest pain, shortness of breath, MI, CVA, DVT, PE, and bleeding disorders.     PHYSICAL EXAMINATION:   BP (!) 161/96 (BP Location: Left arm, Patient Position: Sitting, Cuff Size: Adult Regular)   Pulse 63   Ht 1.556 m (5' 1.25\")   Wt 75.7 kg (166 lb 12.8 oz)   LMP 03/01/2003   SpO2 99%   BMI 31.26 kg/m     BMI: Body mass index is 31.26 kg/m .  Body surface area is 1.81 meters squared.   General: NAD  Chest: bilateral macromastia with grade II ptosis. Well healed breast reduction scars.  +mild shoulder grooving    441g     ASSESSMENT: 50 yo F no significant PMH presents with symptomatic macromastia for re-reduction consult.      PLAN:     According to Schnur scale 441g will need to be removed, this will be approximately 80% reduction from her current size. She understands that this amount will need to be removed to be covered by insurance. I feel that this is a large portion of her breast and may be smaller than she desires. She mentions that she has been ~20lbs lighter in the past and is currently losing weight. She is agreeable to weight loss prior to surgery.     We discussed the risks of nipple wound/nipple necrosis with re-do breast reduction. She is ok with this risk.     I explained the surgical scheduling process, including picking a surgical date and then our team submitting for prior auth 6 weeks prior to this date.     She will return in 3-4 months after weight loss.     Lilly Michael PA-C  Plastic " and Reconstructive Surgery     50 minutes spent on the date of the encounter doing chart review, history and physical, documentation and further activity as noted above.

## 2023-06-20 ENCOUNTER — PRE VISIT (OUTPATIENT)
Dept: PLASTIC SURGERY | Facility: CLINIC | Age: 53
End: 2023-06-20

## 2023-06-20 ENCOUNTER — OFFICE VISIT (OUTPATIENT)
Dept: PLASTIC SURGERY | Facility: CLINIC | Age: 53
End: 2023-06-20
Attending: PHYSICIAN ASSISTANT
Payer: COMMERCIAL

## 2023-06-20 VITALS
SYSTOLIC BLOOD PRESSURE: 161 MMHG | BODY MASS INDEX: 31.49 KG/M2 | HEIGHT: 61 IN | HEART RATE: 63 BPM | OXYGEN SATURATION: 99 % | WEIGHT: 166.8 LBS | DIASTOLIC BLOOD PRESSURE: 96 MMHG

## 2023-06-20 DIAGNOSIS — M54.9 CHRONIC UPPER BACK PAIN: Primary | ICD-10-CM

## 2023-06-20 DIAGNOSIS — G89.29 CHRONIC UPPER BACK PAIN: Primary | ICD-10-CM

## 2023-06-20 DIAGNOSIS — Z98.890 H/O BILATERAL BREAST REDUCTION SURGERY: ICD-10-CM

## 2023-06-20 DIAGNOSIS — N62 MACROMASTIA: ICD-10-CM

## 2023-06-20 PROCEDURE — 99204 OFFICE O/P NEW MOD 45 MIN: CPT | Performed by: PHYSICIAN ASSISTANT

## 2023-06-20 ASSESSMENT — PAIN SCALES - GENERAL: PAINLEVEL: NO PAIN (0)

## 2023-06-20 NOTE — NURSING NOTE
"Chief Complaint   Patient presents with     Consult     Breast reduction consult -- chronic upper back pain        Vitals:    06/20/23 1012   BP: (!) 161/96   BP Location: Left arm   Patient Position: Sitting   Cuff Size: Adult Regular   Pulse: 63   SpO2: 99%   Weight: 75.7 kg (166 lb 12.8 oz)   Height: 1.556 m (5' 1.25\")       Body mass index is 31.26 kg/m .          LOBO COLINDRES EMT    "

## 2023-06-20 NOTE — LETTER
6/20/2023       RE: Alis Norris  [address] [facility] [id number]     Dear Colleague,    Thank you for referring your patient, Alis Norris, to the [facility]ERY [facility]IN[facility] [place] [facility]. Please see a copy of my visit note below.    PLASTIC SURGERY HISTORY AND PHYSICAL    Chief Complaint:Hypertrophy of breasts   Referring Provider: Jayjay Cintron      HPI: Veronica is a 52 year old female who presents with symptomatic macromastia. She currently wears a 38DD, would prefer to be a C. She complains of back pain, neck pain and shoulder pain for many years. She had a breast reduction at 20 years old and it helped with back pain at that time but since then they have gotten larger. Recently had 20lb weight loss and breasts have remained the same size. Does get headaches at time from pain that travels up her neck/back. Wears a bra to bed because pain is worse without a bra. Always wears an underwire for support. Does get intermittent sharp shooting pains in breasts, unsure if related to any activity or time of the month. She has tried the following without successful resolution of pain symptoms: massage, heat packs, chiropractor, pain medication including tylenol/ibuprofen, weight loss. + family history of breast cancer-paternal aunt. No personal breast issues of lumps, bumps or discharge.    Recent mammogram: 4/2023  Rashes: intermittently when it is warm, probably once or twice a summer.     HTN has resolved since weight loss. Did have high blood pressure today but attributes to white coat. Stopped blood pressure meds 1.5 years ago.    PMH:   Past Medical History:   Diagnosis Date    Carcinoma in situ of cervix uteri     s/p LEEP   had pre cancer of cervix- did leep and cone biopsy    PSH:   Past Surgical History:   Procedure Laterality Date    HC CONIZATION CERVIX,KNIFE/LASER  2002    HYSTERECTOMY, PAP  "STILL INDICATED  1/2004    SURGICAL HISTORY OF -       SURGICAL HISTORY OF -   1991   c section x3  Breast reduction 1991  [place] teeth    FH:   Family History   Problem Relation Age of Onset    Obesity Father     Diabetes Father     Hypertension Father         65    Cerebrovascular Disease Maternal Grandmother     Cardiovascular Paternal Grandmother     Diabetes Paternal Grandfather     Depression Mother     Colon Cancer No family hx of     Coronary Artery Disease No family hx of         SH:   Social History     Tobacco Use    Smoking status: Never    Smokeless tobacco: Never   Vaping Use    Vaping status: Never Used   Substance Use Topics    Alcohol use: Yes     Comment: minimal    Drug use: No      Work/school: teacher.    MEDS:   No current outpatient medications on file.       ALLERGIES:   No Known Allergies     ROS: Denies chest pain, shortness of breath, MI, CVA, DVT, PE, and bleeding disorders.     PHYSICAL EXAMINATION:   BP (!) 161/96 (BP Location: Left arm, Patient Position: Sitting, Cuff Size: Adult Regular)   Pulse 63   Ht 1.556 m (5' 1.25\")   Wt 75.7 kg (166 lb 12.8 oz)   LMP 03/01/2003   SpO2 99%   BMI 31.26 kg/m     BMI: Body mass index is 31.26 kg/m .  Body surface area is 1.81 meters squared.   General: NAD  Chest: bilateral macromastia with grade II ptosis. Well healed breast reduction scars.  +mild shoulder grooving    441g     ASSESSMENT: 50 yo F no significant PMH presents with symptomatic macromastia for re-reduction consult.      PLAN:     According to Schnur scale 441g will need to be removed, this will be approximately 80% reduction from her current size. She understands that this amount will need to be removed to be covered by insurance. I feel that this is a large portion of her breast and may be smaller than she desires. She mentions that she has been ~20lbs lighter in the past and is currently losing weight. She is agreeable to weight loss prior to surgery.     We discussed the " risks of nipple wound/nipple necrosis with re-do breast reduction. She is ok with this risk.     I explained the surgical scheduling process, including picking a surgical date and then our team submitting for prior auth 6 weeks prior to this date.     She will return in 3-4 months after weight loss.     Lilly Michael PA-C  Plastic and Reconstructive Surgery     50 minutes spent on the date of the encounter doing chart review, history and physical, documentation and further activity as noted above.

## 2023-07-20 ENCOUNTER — TELEPHONE (OUTPATIENT)
Dept: GASTROENTEROLOGY | Facility: CLINIC | Age: 53
End: 2023-07-20
Payer: COMMERCIAL

## 2023-07-20 NOTE — TELEPHONE ENCOUNTER
Screening Questions  BLUE  KIND OF PREP RED  LOCATION [review exclusion criteria] GREEN  SEDATION TYPE        Y Are you active on mychart?       [place] Ordering/Referring Provider?        [employer] What type of coverage do you have?      N Have you had a positive covid test in the last 14 days?     31.26 1. BMI  [BMI 40+ - review exclusion criteria& smart-phrase document]    Y  2. Are you able to give consent for your medical care? [IF NO,RN REVIEW]          N  3. Are you taking any prescription pain medications on a routine schedule   (ex narcotics: oxycodone, roxicodone, oxycontin,  and percocet)? [RN Review]        N  3a. EXTENDED PREP What kind of prescription?     N 4. Do you have any chemical dependencies such as alcohol, street drugs, or methadone?        **If yes 3- 5 , please schedule with MAC sedation.**          IF YES TO ANY 6 - 10 - HOSPITAL SETTING ONLY.     N 6.   Do you need assistance transferring?     N 7.   Have you had a heart or lung transplant?    N 8.   Are you currently on dialysis?   N 9.   Do you use daily home oxygen?   n 10. Do you take nitroglycerin?   10a. N If yes, how often?     N 11. Are you currently pregnant?    11a. N If yes, how many weeks? [ Greater than 12 weeks, OR NEEDED]    N 12. Do you have Pulmonary Hypertension? *NEED PAC APPT AT [facility] w/ [facility]*     N 13. [review exclusion criteria]  Do you have any implantable devices in your body (pacemaker, defib, LVAD)?    N 14. In the past 6 months, have you had any heart related issues including cardiomyopathy or heart attack?     14a. N If yes, did it require cardiac stenting if so when?     N 15. Have you had a stroke or Transient ischemic attack (TIA - aka  mini stroke ) within 6 months?      N 16. Do you have mod to severe Obstructive Sleep Apnea?  [Hospital only]    n 17. Do you have SEVERE AND UNCONTROLLED asthma? *NEED PAC APPT AT [facility] w/MAC*     18.Do you take blood thinners?  No    N 19. Do you take any of the following  "medications?    NPhentermine    NOzempic    NWegovy (Semaglutide)      19a. If yes, \"Hold for 7 days before procedure.  Please consult your prescribing provider if you have questions about holding this medication.\"     N  20. Do you have chronic kidney disease?      n  21. Do you have a diagnosis of diabetes?     N  22. On a regular basis do you go 3-5 days between bowel movements?     See below 23. Preferred [facility] for Pre Prescription           [address]        - CLOSING REMINDERS -  You will receive a call from a Nurse to review instructions and health history.  This assessment must be completed prior to your procedure.  Failure to complete the Nurse assessment may result in the procedure being cancelled.    On the day of your procedure, please designatean adult(s) who can drive you home stay with you for the next 24 hours. The medicines used in the exam will make you sleepy. You will not be able to drive.    You cannot take public transportation, ride share services, or non-medical taxi service without a responsible caregiver.  Medical transport services are allowed with the requirement that a responsible caregiver will receive you at your destination.  We require that drivers and caregivers are confirmed prior to your procedure.      - SCHEDULING DETAILS -  N & N Hospital Setting Required & If yes, what is the exclusion?   [place]  Surgeon    10-26-23  Date of Procedure  Lower Endoscopy [Colonoscopy]  Type of Procedure Scheduled  [facility]- If you answer yes to questions #8, #20, #21 [  pts ]Which Colonoscopy Prep was Sent?     GEN Sedation Type     N PAC / Pre-op Required              "

## 2023-10-18 RX ORDER — BISACODYL 5 MG/1
TABLET, DELAYED RELEASE ORAL
Qty: 4 TABLET | Refills: 0 | Status: SHIPPED | OUTPATIENT
Start: 2023-10-18 | End: 2023-12-06

## 2023-10-24 ENCOUNTER — ANESTHESIA EVENT (OUTPATIENT)
Dept: GASTROENTEROLOGY | Facility: CLINIC | Age: 53
End: 2023-10-24
Payer: COMMERCIAL

## 2023-10-24 NOTE — ANESTHESIA PREPROCEDURE EVALUATION
Anesthesia Pre-Procedure Evaluation    Patient: Alis Norris   MRN: [id number] : 1970        Procedure : Procedure(s):  Colonoscopy          Past Medical History:   Diagnosis Date     Carcinoma in situ of cervix uteri     s/p LEEP      Past Surgical History:   Procedure Laterality Date     HC CONIZATION CERVIX,KNIFE/LASER      Followed by LEEP-CIS     HYSTERECTOMY, PAP STILL INDICATED  2004    C-Hyst (supracervical) for placenta accreta     SURGICAL HISTORY OF -        x3     SURGICAL HISTORY OF -       Breast reduction surgery      No Known Allergies   Social History     Tobacco Use     Smoking status: Never     Smokeless tobacco: Never   Substance Use Topics     Alcohol use: Yes     Comment: minimal      Wt Readings from Last 1 Encounters:   23 75.7 kg (166 lb 12.8 oz)        Anesthesia Evaluation            ROS/MED HX  ENT/Pulmonary:       Neurologic:       Cardiovascular:     (+) Dyslipidemia hypertension- -   -  - -                                      METS/Exercise Tolerance:     Hematologic:       Musculoskeletal:       GI/Hepatic:       Renal/Genitourinary:       Endo:       Psychiatric/Substance Use:     (+) psychiatric history depression       Infectious Disease:       Malignancy:       Other:            Physical Exam    Airway        Mallampati: II   TM distance: > 3 FB   Neck ROM: full   Mouth opening: > 3 cm    Respiratory Devices and Support         Dental    unable to assess        Cardiovascular   cardiovascular exam normal          Pulmonary   pulmonary exam normal            OUTSIDE LABS:  CBC:   Lab Results   Component Value Date    WBC 6.2 2020    WBC 4.9 2017    HGB 14.1 2020    HGB 13.7 2017    HCT 40.1 2020    HCT 39.5 2017     2020     (L) 2017     BMP:   Lab Results   Component Value Date     2023     2020    POTASSIUM 3.7 2023    POTASSIUM 3.6 2020     "CHLORIDE 104 02/13/2023    CHLORIDE 107 03/04/2020    CO2 24 02/13/2023    CO2 29 03/04/2020    BUN 12.6 02/13/2023    BUN 12 03/04/2020    CR 0.81 02/13/2023    CR 0.69 03/04/2020    GLC 85 02/13/2023    GLC 96 03/04/2020     COAGS: No results found for: \"PTT\", \"INR\", \"FIBR\"  POC: No results found for: \"BGM\", \"HCG\", \"HCGS\"  HEPATIC:   Lab Results   Component Value Date    ALBUMIN 4.1 03/04/2020    PROTTOTAL 7.0 03/04/2020    ALT 67 (H) 03/04/2020    AST 34 03/04/2020    ALKPHOS 62 03/04/2020    BILITOTAL 0.6 03/04/2020     OTHER:   Lab Results   Component Value Date    JANETH 9.3 02/13/2023    TSH 1.22 03/04/2020       Anesthesia Plan    ASA Status:  2       Anesthesia Type: General.   Induction: Intravenous, Propofol.           Consents    Anesthesia Plan(s) and associated risks, benefits, and realistic alternatives discussed. Questions answered and patient/representative(s) expressed understanding.     - Discussed:     - Discussed with:  Patient            Postoperative Care    Pain management: IV analgesics, Oral pain medications.   PONV prophylaxis: Ondansetron (or other 5HT-3), Dexamethasone or Solumedrol     Comments:              BHUMI Schroeder CRNA  "

## 2023-10-26 ENCOUNTER — ANESTHESIA (OUTPATIENT)
Dept: GASTROENTEROLOGY | Facility: CLINIC | Age: 53
End: 2023-10-26
Payer: COMMERCIAL

## 2023-10-26 ENCOUNTER — HOSPITAL ENCOUNTER (OUTPATIENT)
Facility: CLINIC | Age: 53
Discharge: HOME | End: 2023-10-26
Attending: STUDENT IN AN ORGANIZED HEALTH CARE EDUCATION/TRAINING PROGRAM | Admitting: STUDENT IN AN ORGANIZED HEALTH CARE EDUCATION/TRAINING PROGRAM
Payer: COMMERCIAL

## 2023-10-26 VITALS
HEART RATE: 54 BPM | WEIGHT: 166 LBS | BODY MASS INDEX: 31.34 KG/M2 | DIASTOLIC BLOOD PRESSURE: 80 MMHG | TEMPERATURE: 98.1 F | SYSTOLIC BLOOD PRESSURE: 134 MMHG | HEIGHT: 61 IN | RESPIRATION RATE: 16 BRPM | OXYGEN SATURATION: 98 %

## 2023-10-26 DIAGNOSIS — Z12.11 SPECIAL SCREENING FOR MALIGNANT NEOPLASMS, COLON: Primary | ICD-10-CM

## 2023-10-26 LAB — COLONOSCOPY: NORMAL

## 2023-10-26 PROCEDURE — 45378 DIAGNOSTIC COLONOSCOPY: CPT | Performed by: STUDENT IN AN ORGANIZED HEALTH CARE EDUCATION/TRAINING PROGRAM

## 2023-10-26 PROCEDURE — G0121 COLON CA SCRN NOT HI RSK IND: HCPCS | Performed by: STUDENT IN AN ORGANIZED HEALTH CARE EDUCATION/TRAINING PROGRAM

## 2023-10-26 PROCEDURE — 250N000009 HC RX 250

## 2023-10-26 PROCEDURE — 250N000011 HC RX IP 250 OP 636: Performed by: NURSE ANESTHETIST, CERTIFIED REGISTERED

## 2023-10-26 PROCEDURE — 250N000009 HC RX 250: Performed by: NURSE ANESTHETIST, CERTIFIED REGISTERED

## 2023-10-26 PROCEDURE — 370N000017 HC ANESTHESIA TECHNICAL FEE, PER MIN: Performed by: STUDENT IN AN ORGANIZED HEALTH CARE EDUCATION/TRAINING PROGRAM

## 2023-10-26 PROCEDURE — 258N000003 HC RX IP 258 OP 636: Performed by: STUDENT IN AN ORGANIZED HEALTH CARE EDUCATION/TRAINING PROGRAM

## 2023-10-26 RX ORDER — NALOXONE HYDROCHLORIDE 0.4 MG/ML
0.2 INJECTION, SOLUTION INTRAMUSCULAR; INTRAVENOUS; SUBCUTANEOUS
Status: DISCONTINUED | OUTPATIENT
Start: 2023-10-26 | End: 2023-10-26 | Stop reason: HOSPADM

## 2023-10-26 RX ORDER — LIDOCAINE 40 MG/G
CREAM TOPICAL
Status: DISCONTINUED | OUTPATIENT
Start: 2023-10-26 | End: 2023-10-26 | Stop reason: HOSPADM

## 2023-10-26 RX ORDER — PROPOFOL 10 MG/ML
INJECTION, EMULSION INTRAVENOUS PRN
Status: DISCONTINUED | OUTPATIENT
Start: 2023-10-26 | End: 2023-10-26

## 2023-10-26 RX ORDER — FLUMAZENIL 0.1 MG/ML
0.2 INJECTION, SOLUTION INTRAVENOUS
Status: DISCONTINUED | OUTPATIENT
Start: 2023-10-26 | End: 2023-10-26 | Stop reason: HOSPADM

## 2023-10-26 RX ORDER — PROPOFOL 10 MG/ML
INJECTION, EMULSION INTRAVENOUS CONTINUOUS PRN
Status: DISCONTINUED | OUTPATIENT
Start: 2023-10-26 | End: 2023-10-26

## 2023-10-26 RX ORDER — NALOXONE HYDROCHLORIDE 0.4 MG/ML
0.4 INJECTION, SOLUTION INTRAMUSCULAR; INTRAVENOUS; SUBCUTANEOUS
Status: DISCONTINUED | OUTPATIENT
Start: 2023-10-26 | End: 2023-10-26 | Stop reason: HOSPADM

## 2023-10-26 RX ORDER — SODIUM CHLORIDE, SODIUM LACTATE, POTASSIUM CHLORIDE, CALCIUM CHLORIDE 600; 310; 30; 20 MG/100ML; MG/100ML; MG/100ML; MG/100ML
INJECTION, SOLUTION INTRAVENOUS CONTINUOUS
Status: DISCONTINUED | OUTPATIENT
Start: 2023-10-26 | End: 2023-10-26 | Stop reason: HOSPADM

## 2023-10-26 RX ORDER — GLYCOPYRROLATE 0.2 MG/ML
INJECTION, SOLUTION INTRAMUSCULAR; INTRAVENOUS PRN
Status: DISCONTINUED | OUTPATIENT
Start: 2023-10-26 | End: 2023-10-26

## 2023-10-26 RX ADMIN — LIDOCAINE HYDROCHLORIDE 0.1 ML: 10 INJECTION, SOLUTION EPIDURAL; INFILTRATION; INTRACAUDAL; PERINEURAL at 09:42

## 2023-10-26 RX ADMIN — PROPOFOL 70 MG: 10 INJECTION, EMULSION INTRAVENOUS at 10:11

## 2023-10-26 RX ADMIN — PROPOFOL 200 MCG/KG/MIN: 10 INJECTION, EMULSION INTRAVENOUS at 10:09

## 2023-10-26 RX ADMIN — GLYCOPYRROLATE 0.1 MG: 0.2 INJECTION, SOLUTION INTRAMUSCULAR; INTRAVENOUS at 10:09

## 2023-10-26 RX ADMIN — SODIUM CHLORIDE, POTASSIUM CHLORIDE, SODIUM LACTATE AND CALCIUM CHLORIDE: 600; 310; 30; 20 INJECTION, SOLUTION INTRAVENOUS at 09:41

## 2023-10-26 ASSESSMENT — ACTIVITIES OF DAILY LIVING (ADL)
ADLS_ACUITY_SCORE: 35
ADLS_ACUITY_SCORE: 33

## 2023-10-26 NOTE — H&P
[facility]    Pre-Endoscopy History and Physical     Alis Norris MRN# [id number]   YOB: 1970 Age: 53 year old     Date of Procedure: 10/26/2023  Primary care provider: Jayjay Cintron  Type of Endoscopy: Colonoscopy with possible biopsy, possible polypectomy  Reason for Procedure: Screening  Type of Anesthesia Anticipated: Conscious Sedation    HPI:    Alis is a 53 year old female who will be undergoing the above procedure.      A history and physical has been performed. The patient's medications and allergies have been reviewed. The risks and benefits of the procedure and the sedation options and risks were discussed with the patient.  All questions were answered and informed consent was obtained.      She denies a personal or family history of anesthesia complications or bleeding disorders.     First time colonoscopy. HTN, ASA I. Hx of hysterectomy. No family hx of colon cancer. No AC. Screening    Patient Active Problem List   Diagnosis    Recurrent major depression in complete remission (H24)    Ganglion cyst    HYPERTROPHIC SCAR    Hyperlipidemia LDL goal <160    Carcinoma in situ of cervix uteri    Essential hypertension        Past Medical History:   Diagnosis Date    Carcinoma in situ of cervix uteri     s/p LEEP        Past Surgical History:   Procedure Laterality Date    HC CONIZATION CERVIX,KNIFE/LASER      Followed by LEEP-CIS    HYSTERECTOMY, PAP STILL INDICATED  2004    C-Hyst (supracervical) for placenta accreta    SURGICAL HISTORY OF -        x3    SURGICAL HISTORY OF -       Breast reduction surgery       Social History     Tobacco Use    Smoking status: Never    Smokeless tobacco: Never   Substance Use Topics    Alcohol use: Yes     Comment: minimal       Family History   Problem Relation Age of Onset    Obesity Father     Diabetes Father     Hypertension Father         65    Cerebrovascular Disease Maternal Grandmother      "Cardiovascular Paternal Grandmother     Diabetes Paternal Grandfather     Depression Mother     Colon Cancer No family hx of     Coronary Artery Disease No family hx of        Prior to Admission medications    Medication Sig Start Date End Date Taking? Authorizing Provider   bisacodyl (DULCOLAX) 5 MG EC tablet Take 2 tablets at 3 pm the day before your procedure. If your procedure is before 11 am, take 2 additional tablets at 11 pm. If your procedure is after 11 am, take 2 additional tablets at 6 am. For additional instructions refer to your colonoscopy prep instructions. 10/18/23  Yes Jesus Herrmann MD   polyethylene glycol (GOLYTELY) 236 g suspension The night before the exam at 6 pm drink an 8-ounce glass every 15 minutes until the jug is half empty. If you arrive before 11 AM: Drink the other half of the Golytely jug at 11 PM night before procedure. If you arrive after 11 AM: Drink the other half of the Golytely jug at 6 AM day of procedure. For additional instructions refer to your colonoscopy prep instructions. 10/18/23  Yes Jesus Herrmann MD       No Known Allergies     REVIEW OF SYSTEMS:   5 point ROS negative except as noted above in HPI, including Gen., Resp., CV, GI &  system review.    PHYSICAL EXAM:   BP (!) 163/94   Temp 98.3  F (36.8  C) (Oral)   Resp 18   Ht 1.549 m (5' 1\")   Wt 75.3 kg (166 lb)   LMP 03/01/2003   SpO2 98%   BMI 31.37 kg/m   Estimated body mass index is 31.37 kg/m  as calculated from the following:    Height as of this encounter: 1.549 m (5' 1\").    Weight as of this encounter: 75.3 kg (166 lb).   Constitutional: Awake, alert, no acute distress.  Eyes: No scleral icterus.  Conjunctiva are without injection.  ENMT: Mucous membranes moist, dentition and gums are intact.   Neck: Soft, supple, trachea midline.    Endocrine: n/a   Lymphatic: There is no cervical, submandibularadenopathy.  Respiratory: normal efforts on room air  Cardiovascular: extremities warm and well " perfused  Abdomen: Non-distended, non-tender,  No masses,  Musculoskeletal: Full range of motion in the upper and lower extremities.    Skin: No skin rashes or lesions to inspection.  No petechia.    Neurologic: alerted and oriented 3x  Psychiatric: The patient's affect is not blunted and mood is appropriate.  DIAGNOSTICS:    Not indicated    IMPRESSION   ASA Class 1 - Healthy patient, no medical problems    PLAN:   Plan for Colonoscopy with possible biopsy, possible polypectomy. We discussed the risks, benefits and alternatives and the patient wished to proceed.  Patient is cleared for the above procedure.    The above has been forwarded to the consulting provider.    Jesus Herrmann MD on 10/26/2023 at 9:20 AM  [facility]

## 2023-10-26 NOTE — ANESTHESIA POSTPROCEDURE EVALUATION
Patient: Alis Norris    Procedure: Procedure(s):  Colonoscopy       Anesthesia Type:  No value filed.    Note:  Disposition: Outpatient   Postop Pain Control: Uneventful            Sign Out: Well controlled pain   PONV: No   Neuro/Psych: Uneventful            Sign Out: Acceptable/Baseline neuro status   Airway/Respiratory: Uneventful            Sign Out: Acceptable/Baseline resp. status   CV/Hemodynamics: Uneventful            Sign Out: Acceptable CV status; No obvious hypovolemia; No obvious fluid overload   Other NRE: NONE   DID A NON-ROUTINE EVENT OCCUR? No       Last vitals:  Vitals Value Taken Time   /75 10/26/23 1045   Temp 36.7  C (98.1  F) 10/26/23 1038   Pulse 66 10/26/23 1045   Resp 14 10/26/23 1045   SpO2 98 % 10/26/23 1048   Vitals shown include unfiled device data.    Electronically Signed By: BHUMI Spicer CRNA  October 26, 2023  10:57 AM

## 2023-10-26 NOTE — ANESTHESIA CARE TRANSFER NOTE
Patient: Alis Norris    Procedure: Procedure(s):  Colonoscopy       Diagnosis: Screen for colon cancer [Z12.11]  Diagnosis Additional Information: No value filed.    Anesthesia Type:   No value filed.     Note:    Oropharynx: oropharynx clear of all foreign objects  Level of Consciousness: drowsy  Oxygen Supplementation: room air        Vital Signs Stable: post-procedure vital signs reviewed and stable  Report to RN Given: handoff report given  Patient transferred to: Phase II    Handoff Report: Identifed the Patient, Identified the Reponsible Provider, Reviewed the pertinent medical history, Discussed the surgical course, Reviewed Intra-OP anesthesia mangement and issues during anesthesia, Set expectations for post-procedure period and Allowed opportunity for questions and acknowledgement of understanding  Vitals:  Vitals Value Taken Time   BP     Temp     Pulse     Resp     SpO2         Electronically Signed By: BHUMI Spicer CRNA  October 26, 2023  10:37 AM

## 2023-12-06 ENCOUNTER — VIRTUAL VISIT (OUTPATIENT)
Dept: FAMILY MEDICINE | Facility: CLINIC | Age: 53
End: 2023-12-06
Payer: COMMERCIAL

## 2023-12-06 DIAGNOSIS — J06.9 VIRAL URI: Primary | ICD-10-CM

## 2023-12-06 PROCEDURE — 99213 OFFICE O/P EST LOW 20 MIN: CPT | Mod: VID | Performed by: PHYSICIAN ASSISTANT

## 2023-12-06 ASSESSMENT — PATIENT HEALTH QUESTIONNAIRE - PHQ9
10. IF YOU CHECKED OFF ANY PROBLEMS, HOW DIFFICULT HAVE THESE PROBLEMS MADE IT FOR YOU TO DO YOUR WORK, TAKE CARE OF THINGS AT HOME, OR GET ALONG WITH OTHER PEOPLE: NOT DIFFICULT AT ALL
SUM OF ALL RESPONSES TO PHQ QUESTIONS 1-9: 0
SUM OF ALL RESPONSES TO PHQ QUESTIONS 1-9: 0

## 2023-12-06 ASSESSMENT — ENCOUNTER SYMPTOMS: COUGH: 1

## 2023-12-06 NOTE — PATIENT INSTRUCTIONS
Start Claritin-D, Ibuprofen 600 mg three times per day and Afrin nasal spray.     If not improved at all, or if your ears are worse, then we should see you in clinic and we will fit you into the schedule.

## 2023-12-06 NOTE — PROGRESS NOTES
"Veronica is a 53 year old who is being evaluated via a billable video visit.      How would you like to obtain your AVS? [employer]  If the video visit is dropped, the invitation should be resent by: Will log into [employer]  Will anyone else be joining your video visit? No      Assessment & Plan   Viral URI  Patient with upper respiratory symptoms for the last several days. No red flag signs or symptoms at this time. Most likely this is a viral URI.  Will treat conservatively with over-the-counter medicines (regimen in pt instructions).  Return to clinic as needed over the next 1 to 2 weeks for any new, worsening or concerning symptoms. If symptoms worsen, especially ear symptoms, would recommend quick clinic eval, and will fit her in.      BMI:   Estimated body mass index is 31.37 kg/m  as calculated from the following:    Height as of 10/26/23: 1.549 m (5' 1\").    Weight as of 10/26/23: 75.3 kg (166 lb).     DARLIN Robles [facility]    Subjective   Veronica is a 53 year old, presenting for the following health issues:  Cough (Runny nose)        12/6/2023     6:43 AM   Additional Questions   Roomed by Suha HARRINGTON   Accompanied by Self         12/6/2023     6:43 AM   Patient Reported Additional Medications   Patient reports taking the following new medications .     Cough    History of Present Illness       Reason for visit:  Runny nose, cough with mucus, severe ear pain  Symptom onset:  1-3 days ago  Symptom intensity:  Moderate  Symptom progression:  Staying the same  Had these symptoms before:  No    She eats 2-3 servings of fruits and vegetables daily.She consumes 0 sweetened beverage(s) daily.She exercises with enough effort to increase her heart rate 9 or less minutes per day.  She exercises with enough effort to increase her heart rate 3 or less days per week.   She is taking medications regularly.     Yellow mucous from coughing  Head hurts so bad  Feels like she is under water - feels " heart beating  Can smell and eat just fine  Did not take Covid test  Pt is a teacher  Dayquil, ibuprofen    Review of Systems   Respiratory:  Positive for cough.           Objective       Vitals:  No vitals were obtained today due to virtual visit.    Physical Exam   GENERAL: Healthy, alert and no distress  EYES: Eyes grossly normal to inspection.  No discharge or erythema, or obvious scleral/conjunctival abnormalities.  RESP: No audible wheeze, cough, or visible cyanosis.  No visible retractions or increased work of breathing.    SKIN: Visible skin clear. No significant rash, abnormal pigmentation or lesions.  NEURO: Cranial nerves grossly intact.  Mentation and speech appropriate for age.  PSYCH: Mentation appears normal, affect normal/bright, judgement and insight intact, normal speech and appearance well-groomed.        Video-Visit Details  Type of service:  Video Visit     Originating Location (pt. Location): Home    Distant Location (provider location):  On-site  Platform used for Video Visit: Veronica

## 2024-01-15 ENCOUNTER — PATIENT OUTREACH (OUTPATIENT)
Dept: CARE COORDINATION | Facility: CLINIC | Age: 54
End: 2024-01-15
Payer: COMMERCIAL

## 2024-01-29 ENCOUNTER — PATIENT OUTREACH (OUTPATIENT)
Dept: CARE COORDINATION | Facility: CLINIC | Age: 54
End: 2024-01-29
Payer: COMMERCIAL

## 2024-04-07 ENCOUNTER — HEALTH MAINTENANCE LETTER (OUTPATIENT)
Age: 54
End: 2024-04-07

## 2024-08-11 ENCOUNTER — PATIENT OUTREACH (OUTPATIENT)
Dept: CARE COORDINATION | Facility: CLINIC | Age: 54
End: 2024-08-11
Payer: COMMERCIAL

## 2024-12-16 ENCOUNTER — OFFICE VISIT (OUTPATIENT)
Dept: FAMILY MEDICINE | Facility: CLINIC | Age: 54
End: 2024-12-16
Payer: COMMERCIAL

## 2024-12-16 VITALS
RESPIRATION RATE: 16 BRPM | SYSTOLIC BLOOD PRESSURE: 172 MMHG | BODY MASS INDEX: 32.06 KG/M2 | HEART RATE: 64 BPM | TEMPERATURE: 96.8 F | WEIGHT: 174.2 LBS | OXYGEN SATURATION: 99 % | DIASTOLIC BLOOD PRESSURE: 94 MMHG | HEIGHT: 62 IN

## 2024-12-16 DIAGNOSIS — Z13.1 SCREENING FOR DIABETES MELLITUS: ICD-10-CM

## 2024-12-16 DIAGNOSIS — Z00.00 ROUTINE GENERAL MEDICAL EXAMINATION AT A HEALTH CARE FACILITY: ICD-10-CM

## 2024-12-16 DIAGNOSIS — E66.09 CLASS 1 OBESITY DUE TO EXCESS CALORIES WITH SERIOUS COMORBIDITY AND BODY MASS INDEX (BMI) OF 32.0 TO 32.9 IN ADULT: ICD-10-CM

## 2024-12-16 DIAGNOSIS — F41.9 ANXIETY: ICD-10-CM

## 2024-12-16 DIAGNOSIS — Z12.31 ENCOUNTER FOR SCREENING MAMMOGRAM FOR BREAST CANCER: ICD-10-CM

## 2024-12-16 DIAGNOSIS — I10 ESSENTIAL HYPERTENSION: Primary | ICD-10-CM

## 2024-12-16 DIAGNOSIS — E66.811 CLASS 1 OBESITY DUE TO EXCESS CALORIES WITH SERIOUS COMORBIDITY AND BODY MASS INDEX (BMI) OF 32.0 TO 32.9 IN ADULT: ICD-10-CM

## 2024-12-16 DIAGNOSIS — D22.5 NEVUS OF GROIN: ICD-10-CM

## 2024-12-16 DIAGNOSIS — E78.5 HYPERLIPIDEMIA LDL GOAL <160: ICD-10-CM

## 2024-12-16 DIAGNOSIS — F33.42 RECURRENT MAJOR DEPRESSION IN COMPLETE REMISSION (H): ICD-10-CM

## 2024-12-16 LAB
ALBUMIN SERPL BCG-MCNC: 4.8 G/DL (ref 3.5–5.2)
ALP SERPL-CCNC: 70 U/L (ref 40–150)
ALT SERPL W P-5'-P-CCNC: 29 U/L (ref 0–50)
ANION GAP SERPL CALCULATED.3IONS-SCNC: 12 MMOL/L (ref 7–15)
AST SERPL W P-5'-P-CCNC: 20 U/L (ref 0–45)
BILIRUB SERPL-MCNC: 0.5 MG/DL
BUN SERPL-MCNC: 12.9 MG/DL (ref 6–20)
CALCIUM SERPL-MCNC: 9.3 MG/DL (ref 8.8–10.4)
CHLORIDE SERPL-SCNC: 103 MMOL/L (ref 98–107)
CHOLEST SERPL-MCNC: 205 MG/DL
CREAT SERPL-MCNC: 0.98 MG/DL (ref 0.51–0.95)
EGFRCR SERPLBLD CKD-EPI 2021: 68 ML/MIN/1.73M2
EST. AVERAGE GLUCOSE BLD GHB EST-MCNC: 108 MG/DL
FASTING STATUS PATIENT QL REPORTED: NO
FASTING STATUS PATIENT QL REPORTED: NO
GLUCOSE SERPL-MCNC: 103 MG/DL (ref 70–99)
HBA1C MFR BLD: 5.4 % (ref 0–5.6)
HCO3 SERPL-SCNC: 27 MMOL/L (ref 22–29)
HDLC SERPL-MCNC: 63 MG/DL
LDLC SERPL CALC-MCNC: 112 MG/DL
NONHDLC SERPL-MCNC: 142 MG/DL
POTASSIUM SERPL-SCNC: 3.8 MMOL/L (ref 3.4–5.3)
PROT SERPL-MCNC: 7.2 G/DL (ref 6.4–8.3)
SODIUM SERPL-SCNC: 142 MMOL/L (ref 135–145)
TRIGL SERPL-MCNC: 152 MG/DL
TSH SERPL DL<=0.005 MIU/L-ACNC: 1.06 UIU/ML (ref 0.3–4.2)

## 2024-12-16 PROCEDURE — 99396 PREV VISIT EST AGE 40-64: CPT | Performed by: PHYSICIAN ASSISTANT

## 2024-12-16 PROCEDURE — 83036 HEMOGLOBIN GLYCOSYLATED A1C: CPT | Performed by: PHYSICIAN ASSISTANT

## 2024-12-16 PROCEDURE — 84443 ASSAY THYROID STIM HORMONE: CPT | Performed by: PHYSICIAN ASSISTANT

## 2024-12-16 PROCEDURE — 80053 COMPREHEN METABOLIC PANEL: CPT | Performed by: PHYSICIAN ASSISTANT

## 2024-12-16 PROCEDURE — 80061 LIPID PANEL: CPT | Performed by: PHYSICIAN ASSISTANT

## 2024-12-16 PROCEDURE — 36415 COLL VENOUS BLD VENIPUNCTURE: CPT | Performed by: PHYSICIAN ASSISTANT

## 2024-12-16 PROCEDURE — 99214 OFFICE O/P EST MOD 30 MIN: CPT | Mod: 25 | Performed by: PHYSICIAN ASSISTANT

## 2024-12-16 RX ORDER — BUPROPION HYDROCHLORIDE 150 MG/1
150 TABLET ORAL EVERY MORNING
Qty: 30 TABLET | Refills: 1 | Status: SHIPPED | OUTPATIENT
Start: 2024-12-16

## 2024-12-16 RX ORDER — LISINOPRIL 10 MG/1
10 TABLET ORAL DAILY
Qty: 90 TABLET | Refills: 3 | Status: SHIPPED | OUTPATIENT
Start: 2024-12-16

## 2024-12-16 SDOH — HEALTH STABILITY: PHYSICAL HEALTH: ON AVERAGE, HOW MANY DAYS PER WEEK DO YOU ENGAGE IN MODERATE TO STRENUOUS EXERCISE (LIKE A BRISK WALK)?: 0 DAYS

## 2024-12-16 ASSESSMENT — PATIENT HEALTH QUESTIONNAIRE - PHQ9
SUM OF ALL RESPONSES TO PHQ QUESTIONS 1-9: 0
10. IF YOU CHECKED OFF ANY PROBLEMS, HOW DIFFICULT HAVE THESE PROBLEMS MADE IT FOR YOU TO DO YOUR WORK, TAKE CARE OF THINGS AT HOME, OR GET ALONG WITH OTHER PEOPLE: NOT DIFFICULT AT ALL
SUM OF ALL RESPONSES TO PHQ QUESTIONS 1-9: 0

## 2024-12-16 ASSESSMENT — SOCIAL DETERMINANTS OF HEALTH (SDOH): HOW OFTEN DO YOU GET TOGETHER WITH FRIENDS OR RELATIVES?: ONCE A WEEK

## 2024-12-16 ASSESSMENT — PAIN SCALES - GENERAL: PAINLEVEL_OUTOF10: NO PAIN (0)

## 2024-12-16 NOTE — PATIENT INSTRUCTIONS
Restart Lisinopril for blood pressure.     Start Wellbutrin for weight loss as well as anxiety/ADHD type symptoms.     Follow-up with me in 1 month.     Keep up all the lifestyle changes. Lab work is pending and we will adjust as needed.       Patient Education   Preventive Care Advice   This is general advice given by our system to help you stay healthy. However, your care team may have specific advice just for you. Please talk to your care team about your preventive care needs.  Nutrition  Eat 5 or more servings of fruits and vegetables each day.  Try wheat bread, brown rice and whole grain pasta (instead of white bread, rice, and pasta).  Get enough calcium and vitamin D. Check the label on foods and aim for 100% of the RDA (recommended daily allowance).  Lifestyle  Exercise at least 150 minutes each week  (30 minutes a day, 5 days a week).  Do muscle strengthening activities 2 days a week. These help control your weight and prevent disease.  No smoking.  Wear sunscreen to prevent skin cancer.  Have a dental exam and cleaning every 6 months.  Yearly exams  See your health care team every year to talk about:  Any changes in your health.  Any medicines your care team has prescribed.  Preventive care, family planning, and ways to prevent chronic diseases.  Shots (vaccines)   HPV shots (up to age 26), if you've never had them before.  Hepatitis B shots (up to age 59), if you've never had them before.  COVID-19 shot: Get this shot when it's due.  Flu shot: Get a flu shot every year.  Tetanus shot: Get a tetanus shot every 10 years.  Pneumococcal, hepatitis A, and RSV shots: Ask your care team if you need these based on your risk.  Shingles shot (for age 50 and up)  General health tests  Diabetes screening:  Starting at age 35, Get screened for diabetes at least every 3 years.  If you are younger than age 35, ask your care team if you should be screened for diabetes.  Cholesterol test: At age 39, start having a  cholesterol test every 5 years, or more often if advised.  Bone density scan (DEXA): At age 50, ask your care team if you should have this scan for osteoporosis (brittle bones).  Hepatitis C: Get tested at least once in your life.  STIs (sexually transmitted infections)  Before age 24: Ask your care team if you should be screened for STIs.  After age 24: Get screened for STIs if you're at risk. You are at risk for STIs (including HIV) if:  You are sexually active with more than one person.  You don't use condoms every time.  You or a partner was diagnosed with a sexually transmitted infection.  If you are at risk for HIV, ask about PrEP medicine to prevent HIV.  Get tested for HIV at least once in your life, whether you are at risk for HIV or not.  Cancer screening tests  Cervical cancer screening: If you have a cervix, begin getting regular cervical cancer screening tests starting at age 21.  Breast cancer scan (mammogram): If you've ever had breasts, begin having regular mammograms starting at age 40. This is a scan to check for breast cancer.  Colon cancer screening: It is important to start screening for colon cancer at age 45.  Have a colonoscopy test every 10 years (or more often if you're at risk) Or, ask your provider about stool tests like a FIT test every year or Cologuard test every 3 years.  To learn more about your testing options, visit:   .  For help making a decision, visit:   https://bit.ly/my32969.  Prostate cancer screening test: If you have a prostate, ask your care team if a prostate cancer screening test (PSA) at age 55 is right for you.  Lung cancer screening: If you are a current or former smoker ages 50 to 80, ask your care team if ongoing lung cancer screenings are right for you.  For informational purposes only. Not to replace the advice of your health care provider. Copyright   2023 [employer]. All rights reserved. Clinically reviewed by the [facility] Transitions  Program. [employer] [id number] - REV 01/24.

## 2024-12-16 NOTE — PROGRESS NOTES
Preventive Care Visit  [facility]  Jayjay Cintron PA-C, Family Medicine  Dec 16, 2024      Assessment & Plan   Essential hypertension  Elevated here. Took herself off of Lisinopril. Restart and recheck in 1 month. Check CMP.   - Comprehensive metabolic panel; Future  - lisinopril (ZESTRIL) 10 MG tablet; Take 1 tablet (10 mg) by mouth daily.    Class 1 obesity due to excess calories with serious comorbidity and body mass index (BMI) of 32.0 to 32.9 in adult  Body mass index is 32.12 kg/m .. Associated with HTN, HLD which would improve with weight loss. Shared decision making discussed. Rx for Wellbutrin to help with anxiety as well. Encouraged healthy lifestyle changes.   - TSH with free T4 reflex; Future  - buPROPion (WELLBUTRIN XL) 150 MG 24 hr tablet; Take 1 tablet (150 mg) by mouth every morning.  - TSH with free T4 reflex    Recurrent major depression in complete remission (H)  Anxiety  Depression stable and well controlled. Anxiety symptoms not controlled. Start Wellbutrin and follow-up in 1 month for recheck.   - buPROPion (WELLBUTRIN XL) 150 MG 24 hr tablet; Take 1 tablet (150 mg) by mouth every morning.    Hyperlipidemia LDL goal <160  Due for recheck.   - Lipid panel reflex to direct LDL Non-fasting; Future    Routine general medical examination at a health care facility  54 yr old here for physical exam. Last physical exam done 1 year ago. Discussed preventative screenings which are updated below. Counseled on immunizations and healthy lifestyle. Follow-up in 1 year for repeat physical exam.     Screening for diabetes mellitus  Due for screening.   - Hemoglobin A1c; Future    Encounter for screening mammogram for breast cancer  Due for screening.   - MA Screen Bilateral w/Levi; Future    Nevus of groin  Large nevus on the mons pubis. Recommended excision by Derm if she is interested in that.   - Adult Dermatology  Referral; Future    Patient has been advised of split  "billing requirements and indicates understanding: Yes    BMI  Estimated body mass index is 32.12 kg/m  as calculated from the following:    Height as of this encounter: 1.568 m (5' 1.75\").    Weight as of this encounter: 79 kg (174 lb 3.2 oz).     Counseling  Appropriate preventive services were addressed with this patient via screening, questionnaire, or discussion as appropriate for fall prevention, nutrition, physical activity, Tobacco-use cessation, social engagement, weight loss and cognition.  Checklist reviewing preventive services available has been given to the patient.  Reviewed patient's diet, addressing concerns and/or questions.     Subjective   Veronica is a 54 year old, presenting for the following:  Physical        12/16/2024     2:11 PM   Additional Questions   Roomed by Yael GARCIA CMA   Accompanied by Self      HPI    Hypertension  -was on medication during covid. Says that she lost weight and took herself off her medication.     Weight loss  - discuss all weight loss options and the pros and cons     Moles  -has a couple that she would like looked at     Health Care Directive  Patient does not have a Health Care Directive: Discussed advance care planning with patient; information given to patient to review.      12/16/2024   General Health   How would you rate your overall physical health? Good   Feel stress (tense, anxious, or unable to sleep) Only a little      (!) STRESS CONCERN      12/16/2024   Nutrition   Three or more servings of calcium each day? (!) NO   Diet: Regular (no restrictions)   How many servings of fruit and vegetables per day? (!) 0-1   How many sweetened beverages each day? 0-1            12/16/2024   Exercise   Days per week of moderate/strenous exercise 0 days      (!) EXERCISE CONCERN      12/16/2024   Social Factors   Frequency of gathering with friends or relatives Once a week   Worry food won't last until get money to buy more No   Food not last or not have enough money for " food? No   Do you have housing? (Housing is defined as stable permanent housing and does not include staying ouside in a car, in a tent, in an abandoned building, in an overnight shelter, or couch-surfing.) Yes   Are you worried about losing your housing? No   Lack of transportation? No   Unable to get utilities (heat,electricity)? No            12/16/2024   Fall Risk   Fallen 2 or more times in the past year? No    Trouble with walking or balance? No        Patient-reported          12/16/2024   Dental   Dentist two times every year? Yes            12/16/2024   TB Screening   Were you born outside of the [place]? No          Today's PHQ-9 Score:       12/16/2024     2:02 PM   PHQ-9 SCORE   PHQ-9 Total Score MyChart 0   PHQ-9 Total Score 0        Patient-reported         12/16/2024   Substance Use   Alcohol more than 3/day or more than 7/wk No   Do you use any other substances recreationally? No        Social History     Tobacco Use    Smoking status: Never    Smokeless tobacco: Never   Vaping Use    Vaping status: Never Used   Substance Use Topics    Alcohol use: Yes     Comment: minimal    Drug use: No           4/4/2023   LAST FHS-7 RESULTS   1st degree relative breast or ovarian cancer No   Any relative bilateral breast cancer No   Any male have breast cancer No   Any ONE woman have BOTH breast AND ovarian cancer No   Any woman with breast cancer before 50yrs No   2 or more relatives with breast AND/OR ovarian cancer No   2 or more relatives with breast AND/OR bowel cancer No               12/16/2024   STI Screening   New sexual partner(s) since last STI/HIV test? No        History of abnormal Pap smear: 3 years per Pap RN        Latest Ref Rng & Units 2/13/2023     3:34 PM 8/30/2019     9:34 AM 8/30/2019     9:30 AM   PAP / HPV   PAP  Negative for Intraepithelial Lesion or Malignancy (NILM)      PAP (Historical)   NIL     HPV 16 DNA Negative Negative   Negative    HPV 18 DNA Negative Negative   Negative    Other HR  "HPV Negative Negative   Negative      ASCVD Risk   The 10-year ASCVD risk score (Irene ARMIJO, et al., 2019) is: 2.3%    Values used to calculate the score:      Age: 54 years      Sex: Female      Is Non- : No      Diabetic: No      Tobacco smoker: No      Systolic Blood Pressure: 170 mmHg      Is BP treated: No      HDL Cholesterol: 79 mg/dL      Total Cholesterol: 191 mg/dL       Reviewed and updated as needed this visit by Provider                    Review of Systems  See HPI      Objective    Exam  BP (!) 170/110 (BP Location: Right arm, Patient Position: Sitting, Cuff Size: Adult Large)   Pulse 64   Temp 96.8  F (36  C) (Tympanic)   Resp 16   Ht 1.568 m (5' 1.75\")   Wt 79 kg (174 lb 3.2 oz)   LMP 03/01/2003   SpO2 99%   BMI 32.12 kg/m     Estimated body mass index is 32.12 kg/m  as calculated from the following:    Height as of this encounter: 1.568 m (5' 1.75\").    Weight as of this encounter: 79 kg (174 lb 3.2 oz).    Physical Exam  GENERAL: alert and no distress  NECK: no adenopathy, no asymmetry, masses, or scars  RESP: lungs clear to auscultation - no rales, rhonchi or wheezes  CV: regular rate and rhythm, normal S1 S2, no S3 or S4, no murmur, click or rub, no peripheral edema  ABDOMEN: soft, nontender, no hepatosplenomegaly, no masses and bowel sounds normal   (female): JANA Jackson present for exam. External exam reveals a large 3-4 cm nevus located on the left side of the mons pubis.   MS: no gross musculoskeletal defects noted, no edema    Signed Electronically by: Jayjay Cintron PA-C    "

## 2025-01-13 ENCOUNTER — TELEPHONE (OUTPATIENT)
Dept: FAMILY MEDICINE | Facility: CLINIC | Age: 55
End: 2025-01-13
Payer: COMMERCIAL

## 2025-01-13 NOTE — TELEPHONE ENCOUNTER
Patient Quality Outreach    Patient is due for the following:   Hypertension -  Hypertension follow-up visit    Action(s) Taken:   Patient has upcoming appointment, these items will be addressed at that time.    Type of outreach:    Chart review performed, no outreach needed.    Questions for provider review:    None           Yael Gonzalez CMA  Chart routed to Care Team.

## 2025-01-27 ENCOUNTER — OFFICE VISIT (OUTPATIENT)
Dept: FAMILY MEDICINE | Facility: CLINIC | Age: 55
End: 2025-01-27
Payer: COMMERCIAL

## 2025-01-27 VITALS
WEIGHT: 175 LBS | TEMPERATURE: 97.2 F | OXYGEN SATURATION: 98 % | BODY MASS INDEX: 32.27 KG/M2 | RESPIRATION RATE: 18 BRPM | SYSTOLIC BLOOD PRESSURE: 130 MMHG | DIASTOLIC BLOOD PRESSURE: 84 MMHG | HEART RATE: 65 BPM

## 2025-01-27 DIAGNOSIS — E66.09 CLASS 1 OBESITY DUE TO EXCESS CALORIES WITH SERIOUS COMORBIDITY AND BODY MASS INDEX (BMI) OF 32.0 TO 32.9 IN ADULT: ICD-10-CM

## 2025-01-27 DIAGNOSIS — F41.9 ANXIETY: ICD-10-CM

## 2025-01-27 DIAGNOSIS — Z01.84 IMMUNITY STATUS TESTING: ICD-10-CM

## 2025-01-27 DIAGNOSIS — E66.811 CLASS 1 OBESITY DUE TO EXCESS CALORIES WITH SERIOUS COMORBIDITY AND BODY MASS INDEX (BMI) OF 32.0 TO 32.9 IN ADULT: ICD-10-CM

## 2025-01-27 DIAGNOSIS — I10 ESSENTIAL HYPERTENSION: Primary | ICD-10-CM

## 2025-01-27 LAB
ANION GAP SERPL CALCULATED.3IONS-SCNC: 12 MMOL/L (ref 7–15)
BUN SERPL-MCNC: 11.6 MG/DL (ref 6–20)
CALCIUM SERPL-MCNC: 9.4 MG/DL (ref 8.8–10.4)
CHLORIDE SERPL-SCNC: 102 MMOL/L (ref 98–107)
CREAT SERPL-MCNC: 0.79 MG/DL (ref 0.51–0.95)
EGFRCR SERPLBLD CKD-EPI 2021: 88 ML/MIN/1.73M2
GLUCOSE SERPL-MCNC: 82 MG/DL (ref 70–99)
HCO3 SERPL-SCNC: 26 MMOL/L (ref 22–29)
POTASSIUM SERPL-SCNC: 3.7 MMOL/L (ref 3.4–5.3)
SODIUM SERPL-SCNC: 140 MMOL/L (ref 135–145)

## 2025-01-27 PROCEDURE — 99214 OFFICE O/P EST MOD 30 MIN: CPT | Mod: 25 | Performed by: PHYSICIAN ASSISTANT

## 2025-01-27 PROCEDURE — 90480 ADMN SARSCOV2 VAC 1/ONLY CMP: CPT | Performed by: PHYSICIAN ASSISTANT

## 2025-01-27 PROCEDURE — 86706 HEP B SURFACE ANTIBODY: CPT | Performed by: PHYSICIAN ASSISTANT

## 2025-01-27 PROCEDURE — G2211 COMPLEX E/M VISIT ADD ON: HCPCS | Performed by: PHYSICIAN ASSISTANT

## 2025-01-27 PROCEDURE — 36415 COLL VENOUS BLD VENIPUNCTURE: CPT | Performed by: PHYSICIAN ASSISTANT

## 2025-01-27 PROCEDURE — 91320 SARSCV2 VAC 30MCG TRS-SUC IM: CPT | Performed by: PHYSICIAN ASSISTANT

## 2025-01-27 PROCEDURE — 90677 PCV20 VACCINE IM: CPT | Performed by: PHYSICIAN ASSISTANT

## 2025-01-27 PROCEDURE — 80048 BASIC METABOLIC PNL TOTAL CA: CPT | Performed by: PHYSICIAN ASSISTANT

## 2025-01-27 PROCEDURE — 90471 IMMUNIZATION ADMIN: CPT | Performed by: PHYSICIAN ASSISTANT

## 2025-01-27 RX ORDER — BUPROPION HYDROCHLORIDE 150 MG/1
150 TABLET ORAL EVERY MORNING
Qty: 90 TABLET | Refills: 3 | Status: SHIPPED | OUTPATIENT
Start: 2025-01-27

## 2025-01-27 ASSESSMENT — ANXIETY QUESTIONNAIRES
GAD7 TOTAL SCORE: 2
IF YOU CHECKED OFF ANY PROBLEMS ON THIS QUESTIONNAIRE, HOW DIFFICULT HAVE THESE PROBLEMS MADE IT FOR YOU TO DO YOUR WORK, TAKE CARE OF THINGS AT HOME, OR GET ALONG WITH OTHER PEOPLE: NOT DIFFICULT AT ALL
2. NOT BEING ABLE TO STOP OR CONTROL WORRYING: NOT AT ALL
6. BECOMING EASILY ANNOYED OR IRRITABLE: SEVERAL DAYS
GAD7 TOTAL SCORE: 2
1. FEELING NERVOUS, ANXIOUS, OR ON EDGE: SEVERAL DAYS
3. WORRYING TOO MUCH ABOUT DIFFERENT THINGS: NOT AT ALL
5. BEING SO RESTLESS THAT IT IS HARD TO SIT STILL: NOT AT ALL
7. FEELING AFRAID AS IF SOMETHING AWFUL MIGHT HAPPEN: NOT AT ALL

## 2025-01-27 ASSESSMENT — PAIN SCALES - GENERAL: PAINLEVEL_OUTOF10: NO PAIN (0)

## 2025-01-27 ASSESSMENT — PATIENT HEALTH QUESTIONNAIRE - PHQ9
SUM OF ALL RESPONSES TO PHQ QUESTIONS 1-9: 2
5. POOR APPETITE OR OVEREATING: NOT AT ALL

## 2025-01-27 NOTE — PROGRESS NOTES
"  Assessment & Plan   Essential hypertension  Normotensive today in clinic, continue Lisinopril. BMP ordered.   - Basic metabolic panel  (Ca, Cl, CO2, Creat, Gluc, K, Na, BUN); Future    Anxiety  Class 1 obesity due to excess calories with serious comorbidity and body mass index (BMI) of 32.0 to 32.9 in adult  Anxiety is much improved on Wellbutrin. Body mass index is 32.27 kg/m .. Associated with HTN which would improve with weight loss. Wellbutrin has not been helpful for this and will continue to work with her to lose weight. Encouraged healthy lifestyle changes.   - buPROPion (WELLBUTRIN XL) 150 MG 24 hr tablet; Take 1 tablet (150 mg) by mouth every morning.    Immunity status testing  Due for screening.   - Hepatitis B Surface Antibody; Future    The longitudinal plan of care for the diagnosis(es)/condition(s) as documented were addressed during this visit. Due to the added complexity in care, I will continue to support Veronica in the subsequent management and with ongoing continuity of care.       BMI  Estimated body mass index is 32.27 kg/m  as calculated from the following:    Height as of 12/16/24: 1.568 m (5' 1.75\").    Weight as of this encounter: 79.4 kg (175 lb).     Subjective   Veronica is a 54 year old, presenting for the following health issues:  Hypertension and Weight Check      1/27/2025     2:32 PM   Additional Questions   Roomed by Yael GARCIA CMA   Accompanied by Self     History of Present Illness       Hypertension: She presents for follow up of hypertension.  She does not check blood pressure  regularly outside of the clinic. Outside blood pressures have been over 140/90. She does not follow a low salt diet.     She eats 2-3 servings of fruits and vegetables daily.She consumes 0 sweetened beverage(s) daily.She exercises with enough effort to increase her heart rate 9 or less minutes per day.  She exercises with enough effort to increase her heart rate 3 or less days per week.   She is taking medications " regularly.         2/13/2023     3:18 PM 12/6/2023     4:32 AM 12/16/2024     2:02 PM   PHQ   PHQ-9 Total Score 0 0 0    Q9: Thoughts of better off dead/self-harm past 2 weeks Not at all  Not at all Not at all       Patient-reported    Proxy-reported           11/28/2012     9:25 AM 8/30/2019     9:00 AM 3/4/2020     4:04 PM   MARGIE-7 SCORE   Total Score 0     Total Score  0 3     Review of Systems  See HPI       Objective    Pulse 65   Temp 97.2  F (36.2  C) (Tympanic)   Resp 18   Wt 79.4 kg (175 lb)   LMP 03/01/2003   SpO2 98%   BMI 32.27 kg/m    Body mass index is 32.27 kg/m .  Physical Exam   GENERAL: alert and no distress  RESP: lungs clear to auscultation - no rales, rhonchi or wheezes  CV: regular rate and rhythm, normal S1 S2, no S3 or S4, no murmur, click or rub, no peripheral edema  MS: no gross musculoskeletal defects noted, no edema  SKIN: no suspicious lesions or rashes        Physician Attestation   I, Jayjay Cintron PA-C, was present with the medical/MADELIN student who participated in the service and in the documentation of the note.  I have verified the history and personally performed the physical exam and medical decision making.  I agree with the assessment and plan of care as documented in the note.      Jayjay Cintron PA-C

## 2025-01-28 LAB
HBV SURFACE AB SERPL IA-ACNC: <3.5 M[IU]/ML
HBV SURFACE AB SERPL IA-ACNC: NONREACTIVE M[IU]/ML

## 2025-03-04 ENCOUNTER — ANCILLARY PROCEDURE (OUTPATIENT)
Dept: MAMMOGRAPHY | Facility: CLINIC | Age: 55
End: 2025-03-04
Attending: PHYSICIAN ASSISTANT
Payer: COMMERCIAL

## 2025-03-04 DIAGNOSIS — Z12.31 ENCOUNTER FOR SCREENING MAMMOGRAM FOR BREAST CANCER: ICD-10-CM

## 2025-03-04 PROCEDURE — 77063 BREAST TOMOSYNTHESIS BI: CPT | Mod: TC | Performed by: RADIOLOGY

## 2025-03-04 PROCEDURE — 77067 SCR MAMMO BI INCL CAD: CPT | Mod: TC | Performed by: RADIOLOGY

## 2025-08-18 ENCOUNTER — E-VISIT (OUTPATIENT)
Dept: FAMILY MEDICINE | Facility: CLINIC | Age: 55
End: 2025-08-18
Payer: COMMERCIAL

## 2025-08-18 DIAGNOSIS — E66.09 CLASS 1 OBESITY DUE TO EXCESS CALORIES WITH SERIOUS COMORBIDITY AND BODY MASS INDEX (BMI) OF 32.0 TO 32.9 IN ADULT: ICD-10-CM

## 2025-08-18 DIAGNOSIS — E66.811 CLASS 1 OBESITY DUE TO EXCESS CALORIES WITH SERIOUS COMORBIDITY AND BODY MASS INDEX (BMI) OF 32.0 TO 32.9 IN ADULT: ICD-10-CM

## 2025-08-18 DIAGNOSIS — F41.9 ANXIETY: ICD-10-CM

## 2025-08-18 PROCEDURE — 99207 PR NON-BILLABLE SERV PER CHARTING: CPT | Performed by: PHYSICIAN ASSISTANT

## 2025-08-18 ASSESSMENT — ANXIETY QUESTIONNAIRES
IF YOU CHECKED OFF ANY PROBLEMS ON THIS QUESTIONNAIRE, HOW DIFFICULT HAVE THESE PROBLEMS MADE IT FOR YOU TO DO YOUR WORK, TAKE CARE OF THINGS AT HOME, OR GET ALONG WITH OTHER PEOPLE: VERY DIFFICULT
7. FEELING AFRAID AS IF SOMETHING AWFUL MIGHT HAPPEN: NEARLY EVERY DAY
4. TROUBLE RELAXING: NEARLY EVERY DAY
GAD7 TOTAL SCORE: 19
GAD7 TOTAL SCORE: 19
8. IF YOU CHECKED OFF ANY PROBLEMS, HOW DIFFICULT HAVE THESE MADE IT FOR YOU TO DO YOUR WORK, TAKE CARE OF THINGS AT HOME, OR GET ALONG WITH OTHER PEOPLE?: VERY DIFFICULT
3. WORRYING TOO MUCH ABOUT DIFFERENT THINGS: NEARLY EVERY DAY
1. FEELING NERVOUS, ANXIOUS, OR ON EDGE: NEARLY EVERY DAY
6. BECOMING EASILY ANNOYED OR IRRITABLE: MORE THAN HALF THE DAYS
7. FEELING AFRAID AS IF SOMETHING AWFUL MIGHT HAPPEN: NEARLY EVERY DAY
2. NOT BEING ABLE TO STOP OR CONTROL WORRYING: NEARLY EVERY DAY
5. BEING SO RESTLESS THAT IT IS HARD TO SIT STILL: MORE THAN HALF THE DAYS

## 2025-08-19 RX ORDER — BUPROPION HYDROCHLORIDE 150 MG/1
300 TABLET ORAL EVERY MORNING
Qty: 180 TABLET | Refills: 3 | Status: SHIPPED | OUTPATIENT
Start: 2025-08-19

## (undated) RX ORDER — PROPOFOL 10 MG/ML
INJECTION, EMULSION INTRAVENOUS
Status: DISPENSED
Start: 2023-10-26

## (undated) RX ORDER — GLYCOPYRROLATE 0.2 MG/ML
INJECTION, SOLUTION INTRAMUSCULAR; INTRAVENOUS
Status: DISPENSED
Start: 2023-10-26